# Patient Record
Sex: MALE | HISPANIC OR LATINO | Employment: UNEMPLOYED | URBAN - METROPOLITAN AREA
[De-identification: names, ages, dates, MRNs, and addresses within clinical notes are randomized per-mention and may not be internally consistent; named-entity substitution may affect disease eponyms.]

---

## 2017-02-02 ENCOUNTER — HOSPITAL ENCOUNTER (EMERGENCY)
Facility: MEDICAL CENTER | Age: 52
End: 2017-02-02
Attending: EMERGENCY MEDICINE

## 2017-02-02 VITALS
BODY MASS INDEX: 26.89 KG/M2 | SYSTOLIC BLOOD PRESSURE: 138 MMHG | OXYGEN SATURATION: 97 % | DIASTOLIC BLOOD PRESSURE: 78 MMHG | RESPIRATION RATE: 16 BRPM | HEART RATE: 80 BPM | WEIGHT: 187.39 LBS | TEMPERATURE: 98.7 F

## 2017-02-02 DIAGNOSIS — I25.10 CORONARY ARTERY DISEASE DUE TO LIPID RICH PLAQUE: ICD-10-CM

## 2017-02-02 DIAGNOSIS — I25.83 CORONARY ARTERY DISEASE DUE TO LIPID RICH PLAQUE: ICD-10-CM

## 2017-02-02 DIAGNOSIS — Z76.0 MEDICATION REFILL: ICD-10-CM

## 2017-02-02 PROCEDURE — 99282 EMERGENCY DEPT VISIT SF MDM: CPT

## 2017-02-02 RX ORDER — ATORVASTATIN CALCIUM 80 MG/1
80 TABLET, FILM COATED ORAL EVERY EVENING
Qty: 30 TAB | Refills: 0 | Status: SHIPPED | OUTPATIENT
Start: 2017-02-02 | End: 2017-03-07 | Stop reason: SDUPTHER

## 2017-02-02 RX ORDER — CARVEDILOL 6.25 MG/1
6.25 TABLET ORAL 2 TIMES DAILY WITH MEALS
Qty: 60 TAB | Refills: 0 | Status: SHIPPED | OUTPATIENT
Start: 2017-02-02 | End: 2017-03-07 | Stop reason: SDUPTHER

## 2017-02-02 RX ORDER — ATORVASTATIN CALCIUM 80 MG/1
80 TABLET, FILM COATED ORAL NIGHTLY
Status: SHIPPED | COMMUNITY
End: 2019-08-13

## 2017-02-02 RX ORDER — CLOPIDOGREL BISULFATE 75 MG/1
75 TABLET ORAL DAILY
Status: SHIPPED | COMMUNITY
End: 2019-08-13

## 2017-02-02 RX ORDER — CLOPIDOGREL BISULFATE 75 MG/1
75 TABLET ORAL DAILY
Qty: 30 TAB | Refills: 0 | Status: SHIPPED | OUTPATIENT
Start: 2017-02-02 | End: 2017-03-07 | Stop reason: SDUPTHER

## 2017-02-02 RX ORDER — LISINOPRIL 10 MG/1
10 TABLET ORAL DAILY
Qty: 30 TAB | Refills: 0 | Status: SHIPPED | OUTPATIENT
Start: 2017-02-02 | End: 2017-03-07 | Stop reason: SDUPTHER

## 2017-02-02 RX ORDER — LISINOPRIL 10 MG/1
10 TABLET ORAL DAILY
Status: SHIPPED | COMMUNITY
End: 2019-08-13

## 2017-02-02 RX ORDER — CARVEDILOL 6.25 MG/1
6.25 TABLET ORAL 2 TIMES DAILY WITH MEALS
Status: SHIPPED | COMMUNITY
End: 2019-08-13

## 2017-02-02 ASSESSMENT — PAIN SCALES - GENERAL: PAINLEVEL_OUTOF10: 0

## 2017-02-02 NOTE — ED AVS SNAPSHOT
After Visit Summary                                                                                                                Haily Gonzalez   MRN: [id number]    Department:  [facility], Emergency Dept   Date of Visit:  2/2/2017            [facility], Emergency Dept    [address] [facility] [address]    Phone:  [phone]      You were seen by     Alfred Walton M.D.      Your Diagnosis Was     Medication refill     Z76.0       Follow-up Information     1. Follow up with Harlan Moses M.D..    Specialty:  Internal Medicine    Contact information    [address]  [place] [address] [phone]          2. Follow up with [facility].    Contact information    [address] [address] [phone]        3. Schedule an appointment as soon as possible for a visit with Jason Walter M.D..    Specialty:  Family Medicine    Contact information    [address]  [address]  [place] [facility] [address] [phone]        Medication Information     Review all of your home medications and newly ordered medications with your primary doctor and/or pharmacist as soon as possible. Follow medication instructions as directed by your doctor and/or pharmacist.     Please keep your complete medication list with you and share with your physician. Update the information when medications are discontinued, doses are changed, or new medications (including over-the-counter products) are added; and carry medication information at all times in the event of emergency situations.               Medication List      CONTINUE taking these medications        Instructions    aspirin  MG Tbec   Commonly known as:  ECOTRIN    Take 1 Tab by mouth every day.   Dose:  325 mg       atorvastatin 80 MG tablet   Commonly known as:  LIPITOR    Take 1 Tab by mouth every evening.   Dose:  80 mg       carvedilol 6.25 MG Tabs   Commonly  known as:  COREG    Take 1 Tab by mouth 2 times a day, with meals.   Dose:  6.25 mg       clopidogrel 75 MG Tabs   Commonly known as:  PLAVIX    Take 1 Tab by mouth every day.   Dose:  75 mg       lisinopril 10 MG Tabs   Commonly known as:  PRINIVIL    Take 1 Tab by mouth every day.   Dose:  10 mg                 Discharge Instructions       Reposición de medicamentos en el servicio de emergencias  (Medicine Refill at the Emergency Department)  Hoy, hemos repuesto gamez medicamento, arya le recomendamos obtener las reposiciones en el consultorio de gamez médico de cabecera. En el futuro, planifique las reposiciones de modo que no necesite solicitarlas en el servicio de emergencias.  Si el medicamento repuesto fue de mantenimiento, es posible que haya recibido solo lo suficiente para que le alcance hasta que alec nuevamente a gamez médico de cabecera.     Esta información no tiene lucho fin reemplazar el consejo del médico. Asegúrese de hacerle al médico cualquier pregunta que tenga.     Document Released: 03/26/2009 Document Revised: 01/08/2016  Elsevier Interactive Patient Education ©2016 [employer]            Patient Information     Patient Information    Following emergency treatment: all patient requiring follow-up care must return either to a private physician or a clinic if your condition worsens before you are able to obtain further medical attention, please return to the emergency room.     Billing Information    At [facility], we work to make the billing process streamlined for our patients.  Our Representatives are here to answer any questions you may have regarding your hospital bill.  If you have insurance coverage and have supplied your insurance information to us, we will submit a claim to your insurer on your behalf.  Should you have any questions regarding your bill, we can be reached online or by phone as follows:  Online: You are able pay your bills online or live chat with our representatives about any  billing questions you may have. We are here to help Monday - Friday from 8:00am to 7:30pm and 9:00am - 12:00pm on Saturdays.  Please visit [email or web address]  for more information.   Phone:  [phone] or [phone]    Please note that your emergency physician, surgeon, pathologist, radiologist, anesthesiologist, and other specialists are not employed by [facility] and will therefore bill separately for their services.  Please contact them directly for any questions concerning their bills at the numbers below:     Emergency Physician Services:  [phone]  [facility]:  [phone]  [facility]:  [phone]  [facility]:  [phone]    1. Your final bill may vary from the amount quoted upon discharge if all procedures are not complete at that time, or if your doctor has additional procedures of which we are not aware. You will receive an additional bill if you return to the Emergency Department at [facility] for suture removal regardless of the facility of which the sutures were placed.     2. Please arrange for settlement of this account at the emergency registration.    3. All self-pay accounts are due in full at the time of treatment.  If you are unable to meet this obligation then payment is expected within 4-5 days.     4. If you have had radiology studies (CT, X-ray, Ultrasound, MRI), you have received a preliminary result during your emergency department visit. Please contact the radiology department [phone] to receive a copy of your final result. Please discuss the Final result with your primary physician or with the follow up physician provided.     Crisis Hotline:  [place] Crisis Hotline:  5-768-RYRRWTO or 1-281.810.6457  [place] Crisis Hotline:    1-191.135.5788 or 895-619-8162         ED Discharge Follow Up Questions    1. In order to provide you with very good care, we would like to follow up with a  phone call in the next few days.  May we have your permission to contact you?     YES /  NO    2. What is the best phone number to call you? (       )_____-__________    3. What is the best time to call you?      Morning  /  Afternoon  /  Evening                   Patient Signature:  ____________________________________________________________    Date:  ____________________________________________________________

## 2017-02-02 NOTE — ED AVS SNAPSHOT
2/2/2017          Haily Gonzalez  [address]  [place] [address]    Dear Haily:    [facility] wants to ensure your discharge home is safe and you or your loved ones have had all your questions answered regarding your care after you leave the hospital.    You may receive a telephone call within two days of your discharge.  This call is to make certain you understand your discharge instructions as well as ensure we provided you with the best care possible during your stay with us.     The call will only last approximately 3-5 minutes and will be done by a nurse.    Once again, we want to ensure your discharge home is safe and that you have a clear understanding of any next steps in your care.  If you have any questions or concerns, please do not hesitate to contact us, we are here for you.  Thank you for choosing [facility] for your healthcare needs.    Sincerely,    Alfonso Hui    [facility]

## 2017-02-02 NOTE — ED AVS SNAPSHOT
[employer] Access Code: [email or web address]  Expires: 2/24/2017 12:14 PM    Your email address is not on file at [facility].  Email Addresses are required for you to sign up for [employer], please contact [phone] to verify your personal information and to provide your email address prior to attempting to register for [employer].    Haily Gonzalez  [address]  [place] [address]    [employer]  A secure, online tool to manage your health information     [facility]’s [employer]® is a secure, online tool that connects you to your personalized health information from the privacy of your home -- day or night - making it very easy for you to manage your healthcare. Once the activation process is completed, you can even access your medical information using the [employer] manny, which is available for free in the Apple Manny store or Google Play store.     To learn more about [employer], visit [email or web address]    There are two levels of access available (as shown below):   My Chart Features  [facility] Primary Care Doctor [facility]  Specialists [facility]  Urgent  Care Non-[facility] Primary Care Doctor   Email your healthcare team securely and privately 24/7 X X X    Manage appointments: schedule your next appointment; view details of past/upcoming appointments X      Request prescription refills. X      View recent personal medical records, including lab and immunizations X X X X   View health record, including health history, allergies, medications X X X X   Read reports about your outpatient visits, procedures, consult and ER notes X X X X   See your discharge summary, which is a recap of your hospital and/or ER visit that includes your diagnosis, lab results, and care plan X X  X     How to register for [employer]:  Once your e-mail address has been verified, follow the following steps to sign up for [employer].     1. Go to  [email or web address]  2. Click on the Sign Up Now box, which takes you to the New Member Sign Up  page. You will need to provide the following information:  a. Enter your [employer] Access Code exactly as it appears at the top of this page. (You will not need to use this code after you’ve completed the sign-up process. If you do not sign up before the expiration date, you must request a new code.)   b. Enter your date of birth.   c. Enter your home email address.   d. Click Submit, and follow the next screen’s instructions.  3. Create a [employer] ID. This will be your [employer] login ID and cannot be changed, so think of one that is secure and easy to remember.  4. Create a [employer] password. You can change your password at any time.  5. Enter your Password Reset Question and Answer. This can be used at a later time if you forget your password.   6. Enter your e-mail address. This allows you to receive e-mail notifications when new information is available in [employer].  7. Click Sign Up. You can now view your health information.    For assistance activating your [employer] account, call [phone]

## 2017-02-03 NOTE — ED NOTES
"Chief Complaint   Patient presents with   • Medication Refill     pt states needs new prescriptions \"for all of my medications\"      /87 mmHg  Pulse 80  Temp(Src) 37.1 °C (98.7 °F)  Resp 16  Wt 85 kg (187 lb 6.3 oz)  SpO2 97%    [facility], was told needs new prescriptions.  Pt stated has not followed up w/ PCP.  Pt denies CP, denies N/V, denies feeling short of breath.   "

## 2017-02-03 NOTE — ED NOTES
Pt AAOx4 with no complaints of pain. No reports of difficulty breathing or SOB. No signs of distress or discomfort. Ambulatory with no assistance. [place] in lowest position, locked, and call light within reach.

## 2017-02-03 NOTE — DISCHARGE INSTRUCTIONS
Reposición de medicamentos en el servicio de emergencias  (Medicine Refill at the Emergency Department)  Hoy, hemos repuesto gamez medicamento, arya le recomendamos obtener las reposiciones en el consultorio de gamez médico de cabecera. En el futuro, planifique las reposiciones de modo que no necesite solicitarlas en el servicio de emergencias.  Si el medicamento repuesto fue de mantenimiento, es posible que haya recibido solo lo suficiente para que le alcance hasta que alec nuevamente a gamez médico de cabecera.     Esta información no tiene lucho fin reemplazar el consejo del médico. Asegúrese de hacerle al médico cualquier pregunta que tenga.     Document Released: 03/26/2009 Document Revised: 01/08/2016  Elsevier Interactive Patient Education ©2016 Elsevier Inc.

## 2017-02-03 NOTE — ED NOTES
Med rec complete per patient and [facility] [phone].  Allergies reviewed.

## 2017-02-03 NOTE — ED PROVIDER NOTES
"ED Provider Note    CHIEF COMPLAINT  Chief Complaint   Patient presents with   • Medication Refill     pt states needs new prescriptions \"for all of my medications\"        HPI  Haily Gonzalez is a 51 y.o. male who presents with request for medication refill, the patient states he takes a lot of medicines for his heart and today he went to get his medicine refilled at [facility] and they refuse. He states he can't access the family doctor, he has no chest pain or shortness of breath or any complaints whatsoever at this time.    REVIEW OF SYSTEMS  Negative for chest pain, dyspnea.    PAST MEDICAL HISTORY   has a past medical history of DDD (degenerative disc disease), lumbar (6/10/2009).    SOCIAL HISTORY  Social History     Social History Main Topics   • Smoking status: Former Smoker -- 10 years     Quit date: 01/31/2016   • Smokeless tobacco: Never Used   • Alcohol Use: No   • Drug Use: No   • Sexual Activity: Yes       SURGICAL HISTORY   has past surgical history that includes cardiac cath (1/31/16).    CURRENT MEDICATIONS  I personally reviewed the medication list in the charting documentation.     ALLERGIES  No Known Allergies    PHYSICAL EXAM  VITAL SIGNS: /87 mmHg  Pulse 80  Temp(Src) 37.1 °C (98.7 °F)  Resp 16  Wt 85 kg (187 lb 6.3 oz)  SpO2 97%  Constitutional: Alert in no apparent distress.  HENT: No signs of trauma.   Eyes: Conjunctiva normal, Non-icteric.   Chest: Normal nonlabored respirations  Skin: No erythema, No rash.   Musculoskeletal: Good range of motion in all major joints.   Neurologic: Alert, No focal deficits noted.   Psychiatric: Affect normal, Judgment normal.    COURSE & MEDICAL DECISION MAKING  Pertinent Labs & Imaging studies reviewed. (See chart for details)    Encounter Summary: This is a 51 y.o. male with request for medication refill of his usual medications which include Plavix, aspirin, lisinopril, Lipitor and carvedilol. He has no complaints whatsoever. I will " prescribe him a month worth of his medications however more poorly I will refer him to primary care.      DISPOSITION: Discharge Home      FINAL IMPRESSION  1. Medication refill    2. Coronary artery disease due to lipid rich plaque        This dictation was created using voice recognition software. The accuracy of the dictation is limited to the abilities of the software. I expect there may be some errors of grammar and possibly content. The nursing notes were reviewed and certain aspects of this information were incorporated into this note.    Electronically signed by: Alfred Walton, 2/2/2017 5:01 PM

## 2017-02-08 RX ORDER — CLOPIDOGREL BISULFATE 75 MG/1
75 TABLET ORAL DAILY
Qty: 30 TAB | Refills: 0 | OUTPATIENT
Start: 2017-02-08

## 2017-02-09 NOTE — TELEPHONE ENCOUNTER
He is one year out from his heart attack therefore he can discontinue the Plavix. However, he must take aspirin 81 mg daily. He does need to follow-up with somebody either our office or the [facility] clinic.

## 2017-03-07 DIAGNOSIS — I10 ESSENTIAL HYPERTENSION: ICD-10-CM

## 2017-03-07 DIAGNOSIS — I25.10 CORONARY ARTERY DISEASE DUE TO LIPID RICH PLAQUE: ICD-10-CM

## 2017-03-07 DIAGNOSIS — I25.83 CORONARY ARTERY DISEASE DUE TO LIPID RICH PLAQUE: ICD-10-CM

## 2017-03-07 RX ORDER — CLOPIDOGREL BISULFATE 75 MG/1
75 TABLET ORAL DAILY
Qty: 30 TAB | Refills: 0 | Status: CANCELLED | OUTPATIENT
Start: 2017-03-07

## 2017-03-07 RX ORDER — LISINOPRIL 10 MG/1
10 TABLET ORAL DAILY
Qty: 30 TAB | Refills: 0 | Status: CANCELLED | OUTPATIENT
Start: 2017-03-07

## 2017-03-08 ENCOUNTER — HOSPITAL ENCOUNTER (EMERGENCY)
Facility: MEDICAL CENTER | Age: 52
End: 2017-03-08
Attending: EMERGENCY MEDICINE

## 2017-03-08 VITALS
SYSTOLIC BLOOD PRESSURE: 122 MMHG | OXYGEN SATURATION: 97 % | WEIGHT: 187.17 LBS | DIASTOLIC BLOOD PRESSURE: 85 MMHG | TEMPERATURE: 98 F | RESPIRATION RATE: 16 BRPM | HEART RATE: 73 BPM | BODY MASS INDEX: 26.86 KG/M2

## 2017-03-08 DIAGNOSIS — I25.10 CORONARY ARTERY DISEASE INVOLVING NATIVE HEART WITHOUT ANGINA PECTORIS, UNSPECIFIED VESSEL OR LESION TYPE: ICD-10-CM

## 2017-03-08 DIAGNOSIS — Z76.0 MEDICATION REFILL: ICD-10-CM

## 2017-03-08 DIAGNOSIS — I25.10 CORONARY ARTERY DISEASE DUE TO LIPID RICH PLAQUE: ICD-10-CM

## 2017-03-08 DIAGNOSIS — I25.83 CORONARY ARTERY DISEASE DUE TO LIPID RICH PLAQUE: ICD-10-CM

## 2017-03-08 DIAGNOSIS — I10 ESSENTIAL HYPERTENSION: ICD-10-CM

## 2017-03-08 PROCEDURE — 99283 EMERGENCY DEPT VISIT LOW MDM: CPT

## 2017-03-08 RX ORDER — ATORVASTATIN CALCIUM 80 MG/1
80 TABLET, FILM COATED ORAL EVERY EVENING
Qty: 30 TAB | Refills: 0 | Status: SHIPPED | OUTPATIENT
Start: 2017-03-08 | End: 2017-04-07

## 2017-03-08 RX ORDER — CLOPIDOGREL BISULFATE 75 MG/1
75 TABLET ORAL DAILY
Qty: 30 TAB | Refills: 0 | Status: SHIPPED | OUTPATIENT
Start: 2017-03-08 | End: 2017-03-08

## 2017-03-08 RX ORDER — LISINOPRIL 10 MG/1
10 TABLET ORAL DAILY
Qty: 30 TAB | Refills: 0 | Status: SHIPPED | OUTPATIENT
Start: 2017-03-08 | End: 2017-03-08

## 2017-03-08 RX ORDER — LISINOPRIL 10 MG/1
10 TABLET ORAL DAILY
Qty: 30 TAB | Refills: 0 | Status: SHIPPED | OUTPATIENT
Start: 2017-03-08 | End: 2019-08-13

## 2017-03-08 RX ORDER — CLOPIDOGREL BISULFATE 75 MG/1
75 TABLET ORAL DAILY
Qty: 30 TAB | Refills: 0 | Status: SHIPPED | OUTPATIENT
Start: 2017-03-08 | End: 2019-08-13

## 2017-03-08 RX ORDER — CARVEDILOL 6.25 MG/1
6.25 TABLET ORAL 2 TIMES DAILY WITH MEALS
Qty: 60 TAB | Refills: 0 | Status: SHIPPED | OUTPATIENT
Start: 2017-03-08 | End: 2017-03-08

## 2017-03-08 RX ORDER — ATORVASTATIN CALCIUM 80 MG/1
80 TABLET, FILM COATED ORAL EVERY EVENING
Qty: 30 TAB | Refills: 0 | Status: SHIPPED | OUTPATIENT
Start: 2017-03-08 | End: 2017-03-08

## 2017-03-08 RX ORDER — CARVEDILOL 6.25 MG/1
6.25 TABLET ORAL 2 TIMES DAILY WITH MEALS
Qty: 60 TAB | Refills: 0 | Status: SHIPPED | OUTPATIENT
Start: 2017-03-08 | End: 2019-08-13

## 2017-03-08 NOTE — ED NOTES
"Chief Complaint   Patient presents with   • Medication Refill     \"All my heart medications\" seen last month for same     /85 mmHg  Pulse 73  Temp(Src) 36.7 °C (98 °F)  Resp 16  Wt 84.9 kg (187 lb 2.7 oz)  SpO2 97%    Pt has not followed up w/ PCP  "

## 2017-03-08 NOTE — ED AVS SNAPSHOT
[employer] Access Code: [email or web address]  Expires: 4/7/2017  4:20 PM    Your email address is not on file at [facility].  Email Addresses are required for you to sign up for [employer], please contact [phone] to verify your personal information and to provide your email address prior to attempting to register for [employer].    Haily Gonzalez  [address]  [place] [address]    [employer]  A secure, online tool to manage your health information     [facility]’s [employer]® is a secure, online tool that connects you to your personalized health information from the privacy of your home -- day or night - making it very easy for you to manage your healthcare. Once the activation process is completed, you can even access your medical information using the [employer] manny, which is available for free in the Apple Manny store or Google Play store.     To learn more about [employer], visit [email or web address]    There are two levels of access available (as shown below):   My Chart Features  [facility] Primary Care Doctor [facility]  Specialists [facility]  Urgent  Care Non-[facility] Primary Care Doctor   Email your healthcare team securely and privately 24/7 X X X    Manage appointments: schedule your next appointment; view details of past/upcoming appointments X      Request prescription refills. X      View recent personal medical records, including lab and immunizations X X X X   View health record, including health history, allergies, medications X X X X   Read reports about your outpatient visits, procedures, consult and ER notes X X X X   See your discharge summary, which is a recap of your hospital and/or ER visit that includes your diagnosis, lab results, and care plan X X  X     How to register for [employer]:  Once your e-mail address has been verified, follow the following steps to sign up for [employer].     1. Go to  [email or web address]  2. Click on the Sign Up Now box, which takes you to the New Member  Sign Up page. You will need to provide the following information:  a. Enter your [employer] Access Code exactly as it appears at the top of this page. (You will not need to use this code after you’ve completed the sign-up process. If you do not sign up before the expiration date, you must request a new code.)   b. Enter your date of birth.   c. Enter your home email address.   d. Click Submit, and follow the next screen’s instructions.  3. Create a [employer] ID. This will be your [employer] login ID and cannot be changed, so think of one that is secure and easy to remember.  4. Create a [employer] password. You can change your password at any time.  5. Enter your Password Reset Question and Answer. This can be used at a later time if you forget your password.   6. Enter your e-mail address. This allows you to receive e-mail notifications when new information is available in [employer].  7. Click Sign Up. You can now view your health information.    For assistance activating your [employer] account, call [phone]

## 2017-03-08 NOTE — ED AVS SNAPSHOT
3/8/2017          Haily Mattjeanette Gonzalez  [address]  [place] [address]    Dear Haily:    [facility] wants to ensure your discharge home is safe and you or your loved ones have had all your questions answered regarding your care after you leave the hospital.    You may receive a telephone call within two days of your discharge.  This call is to make certain you understand your discharge instructions as well as ensure we provided you with the best care possible during your stay with us.     The call will only last approximately 3-5 minutes and will be done by a nurse.    Once again, we want to ensure your discharge home is safe and that you have a clear understanding of any next steps in your care.  If you have any questions or concerns, please do not hesitate to contact us, we are here for you.  Thank you for choosing [facility] for your healthcare needs.    Sincerely,    Alfonso Hui    [facility]

## 2017-03-08 NOTE — ED AVS SNAPSHOT
Home Care Instructions                                                                                                                Haily Gonzalez   MRN: [id number]    Department:  [facility], Emergency Dept   Date of Visit:  3/8/2017            [facility], Emergency Dept    [address]    [place] [address]    Phone:  [phone]      You were seen by     Bogdan Tidwell M.D.      Your Diagnosis Was     Essential hypertension     I10       Follow-up Information     1. Follow up with [facility].    Contact information    [address]  [place] [id number] [phone]          2. Schedule an appointment as soon as possible for a visit with [facility].    Contact information    [address] [place] [address] [phone]        3. Schedule an appointment as soon as possible for a visit with Outpatient Anticoagulation Services.    Specialty:  Cardiology    Contact information    [address] [facility] [address] [phone]    Additional information:    From  [address], take the [facility] exit (# [address]) and head west on [address].  Turn right on [address].  Turn left on [address].  Turn left on [address].  Park in the [address] or [facility] parking garage, or you may use our  parking located at the [address] Entrance.      Medication Information     Review all of your home medications and newly ordered medications with your primary doctor and/or pharmacist as soon as possible. Follow medication instructions as directed by your doctor and/or pharmacist.     Please keep your complete medication list with you and share with your physician. Update the information when medications are discontinued, doses are changed, or new medications (including over-the-counter products) are added; and carry medication information at all times in the event of emergency situations.               Medication List      CONTINUE taking these medications        Instructions    Morning Afternoon Evening Bedtime    * aspirin  MG Tbec   Commonly known as:  ECOTRIN        Take 325 mg by mouth every day.   Dose:  325 mg                        * aspirin  MG Tbec   Commonly known as:  ECOTRIN        Take 1 Tab by mouth every day.   Dose:  325 mg                        * atorvastatin 80 MG tablet   Commonly known as:  LIPITOR        Take 80 mg by mouth every evening.   Dose:  80 mg                        * atorvastatin 80 MG tablet   Commonly known as:  LIPITOR        Take 1 Tab by mouth every evening for 30 days. Needs to be seen for further refills. Thank you   Dose:  80 mg                        * carvedilol 6.25 MG Tabs   Commonly known as:  COREG        Take 6.25 mg by mouth 2 times a day, with meals.   Dose:  6.25 mg                        * carvedilol 6.25 MG Tabs   Commonly known as:  COREG        Take 1 Tab by mouth 2 times a day, with meals. Needs to be seen for further refills. Thank you   Dose:  6.25 mg                        * clopidogrel 75 MG Tabs   Commonly known as:  PLAVIX        Take 75 mg by mouth every day.   Dose:  75 mg                        * clopidogrel 75 MG Tabs   Commonly known as:  PLAVIX        Take 1 Tab by mouth every day. Needs to be seen for further refills. Thank you   Dose:  75 mg                        * lisinopril 10 MG Tabs   Commonly known as:  PRINIVIL        Take 10 mg by mouth every day.   Dose:  10 mg                        * lisinopril 10 MG Tabs   Commonly known as:  PRINIVIL        Take 1 Tab by mouth every day. Needs to be seen for further refills. Thank you   Dose:  10 mg                        * Notice:  This list has 10 medication(s) that are the same as other medications prescribed for you. Read the directions carefully, and ask your doctor or other care provider to review them with you.         Where to Get Your Medications      You can get these medications from any  pharmacy     Bring a paper prescription for each of these medications    - atorvastatin 80 MG tablet  - carvedilol 6.25 MG Tabs  - clopidogrel 75 MG Tabs  - lisinopril 10 MG Tabs              Discharge Instructions       Reposición de medicamentos en el servicio de emergencias  (Medicine Refill at the Emergency Department)  Hoy, hemos repuesto gamez medicamento, arya le recomendamos obtener las reposiciones en el consultorio de gamez médico de cabecera. En el futuro, planifique las reposiciones de modo que no necesite solicitarlas en el servicio de emergencias.  Si el medicamento repuesto fue de mantenimiento, es posible que haya recibido solo lo suficiente para que le alcance hasta que alec nuevamente a gamez médico de cabecera.     Esta información no tiene lucho fin reemplazar el consejo del médico. Asegúrese de hacerle al médico cualquier pregunta que tenga.     Document Released: 03/26/2009 Document Revised: 01/08/2016  [employer] Interactive Patient Education ©2016 [employer]            Patient Information     Patient Information    Following emergency treatment: all patient requiring follow-up care must return either to a private physician or a clinic if your condition worsens before you are able to obtain further medical attention, please return to the emergency room.     Billing Information    At [facility], we work to make the billing process streamlined for our patients.  Our Representatives are here to answer any questions you may have regarding your hospital bill.  If you have insurance coverage and have supplied your insurance information to us, we will submit a claim to your insurer on your behalf.  Should you have any questions regarding your bill, we can be reached online or by phone as follows:  Online: You are able pay your bills online or live chat with our representatives about any billing questions you may have. We are here to help Monday - Friday from 8:00am to 7:30pm and 9:00am - 12:00pm on  Saturdays.  Please visit [email or web address]  for more information.   Phone:  [phone] or [phone]    Please note that your emergency physician, surgeon, pathologist, radiologist, anesthesiologist, and other specialists are not employed by [facility] and will therefore bill separately for their services.  Please contact them directly for any questions concerning their bills at the numbers below:     Emergency Physician Services:  [phone]  [facility]:  [phone]  [facility]:  [phone]  [facility]:  [phone]    1. Your final bill may vary from the amount quoted upon discharge if all procedures are not complete at that time, or if your doctor has additional procedures of which we are not aware. You will receive an additional bill if you return to the Emergency Department at [facility] for suture removal regardless of the facility of which the sutures were placed.     2. Please arrange for settlement of this account at the emergency registration.    3. All self-pay accounts are due in full at the time of treatment.  If you are unable to meet this obligation then payment is expected within 4-5 days.     4. If you have had radiology studies (CT, X-ray, Ultrasound, MRI), you have received a preliminary result during your emergency department visit. Please contact the radiology department [phone] to receive a copy of your final result. Please discuss the Final result with your primary physician or with the follow up physician provided.     Crisis Hotline:  [place] Crisis Hotline:  6-930-GPRVZVU or 1-366.301.5031  [place] Crisis Hotline:    1-578.735.7460 or 003-852-0360         ED Discharge Follow Up Questions    1. In order to provide you with very good care, we would like to follow up with a phone call in the next few days.  May we have your permission to contact you?     YES /  NO    2. What is the best  phone number to call you? (       )_____-__________    3. What is the best time to call you?      Morning  /  Afternoon  /  Evening                   Patient Signature:  ____________________________________________________________    Date:  ____________________________________________________________

## 2017-03-09 NOTE — DISCHARGE INSTRUCTIONS
Reposición de medicamentos en el servicio de emergencias  (Medicine Refill at the Emergency Department)  Hoy, hemos repuesto gamez medicamento, arya le recomendamos obtener las reposiciones en el consultorio de gamez médico de cabecera. En el futuro, planifique las reposiciones de modo que no necesite solicitarlas en el servicio de emergencias.  Si el medicamento repuesto fue de mantenimiento, es posible que haya recibido solo lo suficiente para que le alcance hasta que alec nuevamente a gamez médico de cabecera.     Esta información no tiene lucoh fin reemplazar el consejo del médico. Asegúrese de hacerle al médico cualquier pregunta que tenga.     Document Released: 03/26/2009 Document Revised: 01/08/2016  [employer] Interactive Patient Education ©2016 [employer] Inc[employer]

## 2017-03-09 NOTE — ED PROVIDER NOTES
"ED Provider Note    CHIEF COMPLAINT  Chief Complaint   Patient presents with   • Medication Refill     \"All my heart medications\" seen last month for same        HPI  Haily Gonzalez is a 51 y.o. male who presents to the ED just for refill of \"all of his heart medications.\" The patient was seen here last month for the same thing and was told that he needed to get a primary care physician. Currently has no symptoms. Denies any chest pain, fever, chills, nausea, vomiting, severe because he ran out of his medications.    REVIEW OF SYSTEMS  See HPI for further details. All other systems are negative.     PAST MEDICAL HISTORY  Past Medical History   Diagnosis Date   • DDD (degenerative disc disease), lumbar 6/10/2009       FAMILY HISTORY  Family History   Problem Relation Age of Onset   • Diabetes Mother    • Hypertension Father    • Heart Attack Father 70     CAd     Patient's family history has been discussed and is been found to be noncontributory to his present illness  SOCIAL HISTORY  Social History     Social History   • Marital Status: Single     Spouse Name: N/A   • Number of Children: N/A   • Years of Education: N/A     Social History Main Topics   • Smoking status: Former Smoker -- 10 years     Quit date: 01/31/2016   • Smokeless tobacco: Never Used   • Alcohol Use: No   • Drug Use: No   • Sexual Activity: Yes     Other Topics Concern   • None     Social History Narrative      Harlan Moses M.D.      SURGICAL HISTORY  Past Surgical History   Procedure Laterality Date   • Cardiac cath  1/31/16     unable to open RCA with Thombus       CURRENT MEDICATIONS  Home Medications     **Home medications have not yet been reviewed for this encounter**        No current facility-administered medications on file prior to encounter.     Current Outpatient Prescriptions on File Prior to Encounter   Medication Sig Dispense Refill   • aspirin EC (ECOTRIN) 325 MG Tablet Delayed Response Take 1 Tab by mouth every day. 60 " Tab 0   • atorvastatin (LIPITOR) 80 MG tablet Take 80 mg by mouth every evening.     • carvedilol (COREG) 6.25 MG Tab Take 6.25 mg by mouth 2 times a day, with meals.     • clopidogrel (PLAVIX) 75 MG Tab Take 75 mg by mouth every day.     • lisinopril (PRINIVIL) 10 MG Tab Take 10 mg by mouth every day.     • aspirin EC (ECOTRIN) 325 MG Tablet Delayed Response Take 325 mg by mouth every day.         ALLERGIES  No Known Allergies    PHYSICAL EXAM  VITAL SIGNS: /85 mmHg  Pulse 73  Temp(Src) 36.7 °C (98 °F)  Resp 16  Wt 84.9 kg (187 lb 2.7 oz)  SpO2 97%  Pulse Oximetry was obtained. It showed a reading of Pulse Oximetry: 97 %.  I interpreted this as not hypoxic.   Constitutional :  Well developed, Well nourished, No acute distress, Non-toxic appearance.   HENT: Head is normal without signs of trauma. Posterior pharynx is normal  Eyes: Conjunctiva as normal  Neck: Normal range of motion, No tenderness, Supple, No stridor.   Lymphatic: No anterior lymphadenopathy. No carotid bruit.   Cardiovascular: Normal heart rate, Normal rhythm, No murmurs, No rubs, No gallops.   Thorax & Lungs: Clear to auscultation bilaterally  Skin: Warm, Dry, No erythema, No rash.   Extremities: Intact distal pulses, No edema, No tenderness, No cyanosis, No clubbing.       RADIOLOGY/PROCEDURES  None    COURSE & MEDICAL DECISION MAKING  Pertinent Labs & Imaging studies reviewed. (See chart for details)  Patient presents for evaluation. Clinically the patient appears well. Blood pressure is otherwise well. I explained to the patient in a more than happy to give him a month's worth of his medications but he needs to follow-up with a primary care physician. Patient states his primary care physician O longer takes his insurance have recommended that he follow-up with [facility]'s clinic. I have explained to the patient that there is more to preventative care than just getting her medications. It's about ensuring that the cholesterols are  normal with the medications are doing their underlying job. At this point, I do not feel that laboratory studies are emergently necessary, but I do feel that the patient needs to follow-up with her primary care physician and not have his medicines refilled in the emergency department.    FINAL IMPRESSION  1. Essential hypertension    2. Coronary artery disease involving native heart without angina pectoris, unspecified vessel or lesion type    3. Medication refill    4. Coronary artery disease due to lipid rich plaque           The patient will return for new or worsening symptoms and is stable at the time of discharge.    The patient is referred to a primary physician for blood pressure management, diabetic screening, and for all other preventative health concerns.    DISPOSITION:  Patient will be discharged home in stable condition.    FOLLOW UP:  [facility]  [address]  [facility] [address]  [phone]          [address] [address]  [phone]  Schedule an appointment as soon as possible for a visit      Outpatient Anticoagulation Services  [address] [facility] [id number] [phone]  Schedule an appointment as soon as possible for a visit        OUTPATIENT MEDICATIONS:  Discharge Medication List as of 3/8/2017  4:23 PM                Electronically signed by: Bogdna Tidwell, 3/8/2017

## 2019-08-13 ENCOUNTER — HOSPITAL ENCOUNTER (EMERGENCY)
Facility: MEDICAL CENTER | Age: 54
End: 2019-08-13
Attending: EMERGENCY MEDICINE

## 2019-08-13 ENCOUNTER — APPOINTMENT (OUTPATIENT)
Dept: RADIOLOGY | Facility: MEDICAL CENTER | Age: 54
End: 2019-08-13

## 2019-08-13 VITALS
HEIGHT: 67 IN | WEIGHT: 182.76 LBS | BODY MASS INDEX: 28.69 KG/M2 | TEMPERATURE: 98.1 F | DIASTOLIC BLOOD PRESSURE: 80 MMHG | RESPIRATION RATE: 16 BRPM | OXYGEN SATURATION: 98 % | SYSTOLIC BLOOD PRESSURE: 112 MMHG | HEART RATE: 55 BPM

## 2019-08-13 DIAGNOSIS — R07.89 ATYPICAL CHEST PAIN: ICD-10-CM

## 2019-08-13 LAB
ALBUMIN SERPL BCP-MCNC: 4.5 G/DL (ref 3.2–4.9)
ALBUMIN/GLOB SERPL: 1.4 G/DL
ALP SERPL-CCNC: 69 U/L (ref 30–99)
ALT SERPL-CCNC: 20 U/L (ref 2–50)
ANION GAP SERPL CALC-SCNC: 11 MMOL/L (ref 0–11.9)
AST SERPL-CCNC: 22 U/L (ref 12–45)
BASOPHILS # BLD AUTO: 0.7 % (ref 0–1.8)
BASOPHILS # BLD: 0.03 K/UL (ref 0–0.12)
BILIRUB SERPL-MCNC: 1.2 MG/DL (ref 0.1–1.5)
BUN SERPL-MCNC: 20 MG/DL (ref 8–22)
CALCIUM SERPL-MCNC: 9.6 MG/DL (ref 8.5–10.5)
CHLORIDE SERPL-SCNC: 102 MMOL/L (ref 96–112)
CO2 SERPL-SCNC: 24 MMOL/L (ref 20–33)
CREAT SERPL-MCNC: 1.19 MG/DL (ref 0.5–1.4)
EKG IMPRESSION: NORMAL
EKG IMPRESSION: NORMAL
EOSINOPHIL # BLD AUTO: 0.06 K/UL (ref 0–0.51)
EOSINOPHIL NFR BLD: 1.4 % (ref 0–6.9)
ERYTHROCYTE [DISTWIDTH] IN BLOOD BY AUTOMATED COUNT: 43.9 FL (ref 35.9–50)
GLOBULIN SER CALC-MCNC: 3.3 G/DL (ref 1.9–3.5)
GLUCOSE SERPL-MCNC: 103 MG/DL (ref 65–99)
HCT VFR BLD AUTO: 49 % (ref 42–52)
HGB BLD-MCNC: 16.2 G/DL (ref 14–18)
IMM GRANULOCYTES # BLD AUTO: 0.01 K/UL (ref 0–0.11)
IMM GRANULOCYTES NFR BLD AUTO: 0.2 % (ref 0–0.9)
LYMPHOCYTES # BLD AUTO: 1.38 K/UL (ref 1–4.8)
LYMPHOCYTES NFR BLD: 32.2 % (ref 22–41)
MCH RBC QN AUTO: 29.1 PG (ref 27–33)
MCHC RBC AUTO-ENTMCNC: 33.1 G/DL (ref 33.7–35.3)
MCV RBC AUTO: 88.1 FL (ref 81.4–97.8)
MONOCYTES # BLD AUTO: 0.51 K/UL (ref 0–0.85)
MONOCYTES NFR BLD AUTO: 11.9 % (ref 0–13.4)
NEUTROPHILS # BLD AUTO: 2.29 K/UL (ref 1.82–7.42)
NEUTROPHILS NFR BLD: 53.6 % (ref 44–72)
NRBC # BLD AUTO: 0 K/UL
NRBC BLD-RTO: 0 /100 WBC
PLATELET # BLD AUTO: 203 K/UL (ref 164–446)
PMV BLD AUTO: 8.6 FL (ref 9–12.9)
POTASSIUM SERPL-SCNC: 3.9 MMOL/L (ref 3.6–5.5)
PROT SERPL-MCNC: 7.8 G/DL (ref 6–8.2)
RBC # BLD AUTO: 5.56 M/UL (ref 4.7–6.1)
SODIUM SERPL-SCNC: 137 MMOL/L (ref 135–145)
TROPONIN T SERPL-MCNC: <6 NG/L (ref 6–19)
TROPONIN T SERPL-MCNC: <6 NG/L (ref 6–19)
WBC # BLD AUTO: 4.3 K/UL (ref 4.8–10.8)

## 2019-08-13 PROCEDURE — 80053 COMPREHEN METABOLIC PANEL: CPT

## 2019-08-13 PROCEDURE — 36415 COLL VENOUS BLD VENIPUNCTURE: CPT

## 2019-08-13 PROCEDURE — 93005 ELECTROCARDIOGRAM TRACING: CPT

## 2019-08-13 PROCEDURE — 99284 EMERGENCY DEPT VISIT MOD MDM: CPT

## 2019-08-13 PROCEDURE — 84484 ASSAY OF TROPONIN QUANT: CPT

## 2019-08-13 PROCEDURE — 93005 ELECTROCARDIOGRAM TRACING: CPT | Performed by: EMERGENCY MEDICINE

## 2019-08-13 PROCEDURE — 71045 X-RAY EXAM CHEST 1 VIEW: CPT

## 2019-08-13 PROCEDURE — 85025 COMPLETE CBC W/AUTO DIFF WBC: CPT

## 2019-08-13 NOTE — ED PROVIDER NOTES
ED Provider Note    Scribed for Mk Trujillo M.D. by Mk Trujillo. 8/13/2019,  4:16 PM.    CHIEF COMPLAINT  Chief Complaint   Patient presents with   • Chest Pain     left chest since last night    • Abdominal Pain     left middle        HPI  Haily Gonzalez is a 54 y.o. male who presents to the Emergency Department for intermittent pain in the left side of his chest, which she says actually happens from time to time, not just since last night.  He says that he has not had fevers or chills or nausea or vomiting.  He notices this pain more when he lifts something heavy, but not with walking or elevated heart rate per se.  He has not had diaphoresis or vomiting.  There is been no radiation of the pain.  He is concerned because of a history of ST elevation MI, and coronary artery disease with a previous failed angioplasty and thrombectomy.  He is followed by the [facility] clinic.  The abdominal pain he feels is a separate issue.  He thinks it is probably related to some constipation, with having to strain to stool.  The pain is in the left side of the abdomen, lateral to the umbilicus.  He does not feel it is related in time or location to the chest discomfort.      REVIEW OF SYSTEMS  See HPI for further details. All other systems are negative.     PAST MEDICAL HISTORY   has a past medical history of CAD (coronary artery disease) and DDD (degenerative disc disease), lumbar (6/10/2009).    SOCIAL HISTORY  Social History     Tobacco Use   • Smoking status: Former Smoker     Years: 10.00     Last attempt to quit: 1/31/2016     Years since quitting: 3.5   • Smokeless tobacco: Never Used   Substance and Sexual Activity   • Alcohol use: No   • Drug use: No   • Sexual activity: Yes     Social History     Substance and Sexual Activity   Drug Use No       SURGICAL HISTORY   has a past surgical history that includes zzz cardiac cath (1/31/16).    CURRENT MEDICATIONS  Home Medications      "Reviewed by Antonia Ko R.N. (Registered Nurse) on 08/13/19 at 1318  Med List Status: Partial   Medication Last Dose Status   aspirin EC (ECOTRIN) 325 MG Tablet Delayed Response 8/13/2019 Active   atorvastatin (LIPITOR) 80 MG tablet 8/13/2019 Active   carvedilol (COREG) 6.25 MG Tab 8/13/2019 Active   clopidogrel (PLAVIX) 75 MG Tab 8/13/2019 Active   lisinopril (PRINIVIL) 10 MG Tab 8/13/2019 Active                ALLERGIES  No Known Allergies    PHYSICAL EXAM  VITAL SIGNS: /91   Pulse 60   Temp 36.7 °C (98.1 °F) (Oral)   Resp 16   Ht 1.702 m (5' 7\")   Wt 82.9 kg (182 lb 12.2 oz)   SpO2 98%   BMI 28.62 kg/m²   Pulse ox interpretation: I interpret this pulse ox as normal.  Constitutional: Alert in no apparent distress.  HENT: No signs of trauma, Bilateral external ears normal, Nose normal.   Eyes: Conjunctiva normal, Non-icteric.   Neck: Normal range of motion, Supple, No stridor.   Lymphatic: No lymphadenopathy noted.   Cardiovascular: Regular rate and rhythm, no murmurs.   Thorax & Lungs: Normal breath sounds, No respiratory distress, No wheezing, No chest tenderness.   Abdomen: Bowel sounds normal, Soft, No tenderness, No masses, No pulsatile masses. No peritoneal signs.  Skin: Warm, Dry, No erythema, No rash.   Back: No midline bony tenderness.   Extremities: Intact distal pulses, No edema, No cyanosis.  Musculoskeletal: Good range of motion in all major joints. No or major deformities noted.   Neurologic: Alert , Normal motor function, Normal sensory function, No focal deficits noted.   Psychiatric: Affect normal, Judgment normal, Mood normal.     DIAGNOSTIC STUDIES / PROCEDURES    EKG  Interpreted by me    Rhythm:  Normal sinus rhythm   EARLY PRECORDIAL R/S TRANSITION  INFERIOR INFARCT, AGE INDETERMINATE  Compared to ECG 12/28/2016 19:36:02  No significant changes    EKG#2:   Interpreted by me    SINUS BRADYCARDIA  BORDERLINE AV CONDUCTION DELAY  EARLY PRECORDIAL R/S TRANSITION  INFERIOR " INFARCT, AGE INDETERMINATE  Compared to ECG 08/13/2019 13:14:18  Sinus rhythm no longer present  Myocardial infarct finding still present      LABS  Labs Reviewed   CBC WITH DIFFERENTIAL - Abnormal; Notable for the following components:       Result Value    WBC 4.3 (*)     MCHC 33.1 (*)     MPV 8.6 (*)     All other components within normal limits   COMP METABOLIC PANEL - Abnormal; Notable for the following components:    Glucose 103 (*)     All other components within normal limits   TROPONIN   ESTIMATED GFR   TROPONIN     All labs reviewed by me.    RADIOLOGY  DX-CHEST-PORTABLE (1 VIEW)   Final Result      No acute cardiopulmonary disease.        The radiologist's interpretation of all radiological studies have been reviewed by me.    COURSE & MEDICAL DECISION MAKING  Nursing notes, VS, PMSFHx reviewed in chart.     4:16 PM Patient seen and examined at bedside.  He has a significant history, but his symptoms are quite atypical, his presenting EKG is unchanged, and his first troponin, ordered from triage, was undetectable.  We will repeat a second troponin at this time.  Differential diagnosis includes but is not limited to ACS, angina, pneumonia, musculoskeletal chest pain. Ordered for serial troponins and serial EKGs, chest x-ray, cardiac monitoring to evaluate.    4:53 PM this patient's her EKG EKG is unchanged from previous, which was unchanged from 2016.  His repeat troponin is pending.    5:45 PM this patient serial EKGs were unchanged, and serial troponins were undetectable.  There is no sign of acute coronary syndrome or any dangerous cause of the patient's symptoms.  He was strongly urged to follow-up with his primary care clinic, or return here if necessary for worsening symptoms.     The patient will return for new or worsening symptoms and is stable at the time of discharge.    The patient is referred to a primary physician for blood pressure management, diabetic screening, and for all other  preventative health concerns.    DISPOSITION:  Patient will be discharged home in stable condition.    FOLLOW UP:  [address] [address] [phone]  Schedule an appointment as soon as possible for a visit         OUTPATIENT MEDICATIONS:  New Prescriptions    No medications on file         FINAL IMPRESSION  1. Atypical chest pain

## 2019-08-13 NOTE — ED NOTES
Pt to [facility] with family.  Pt here for above complaint.  Connected to cardiac monitor, BP cuff, and continuous pulse ox upon arrival.  Pt in gown, call light in reach, bed in lowest position.  Chart up for ERP.

## 2019-08-13 NOTE — ED NOTES
Chief Complaint   Patient presents with   • Chest Pain     left chest since last night    • Abdominal Pain     left middle      Pt ambulated to triage with above complaints started yesterday.   Protocol ordered.

## 2019-08-14 NOTE — DISCHARGE INSTRUCTIONS
Karlene pruebas aquí fueron tranquilizadoras. No hay signos de ningún daño en gamez corazón. Programe fabio visita de seguimiento con gamez clínica de atención primaria o use la información de contacto de la clínica que le proporcionamos aquí. Regrese al departamento de emergencias por empeoramiento o síntomas graves que gamez clínica de atención primaria no puede manejar.    (Your tests here were all reassuring.  There is no sign of any damage to your heart.  Please schedule a follow-up visit with your primary care clinic or use the contact information for the clinic we have provided here. Return to the emergency department for worsening or severe symptoms that cannot be managed by your primary care clinic.)

## 2019-08-14 NOTE — ED NOTES
Med rec updated and complete. Allergies reviewed.  Pt is not currently taking any medications . All medications   Removed from med rec.  [facility].

## 2020-04-03 ENCOUNTER — APPOINTMENT (OUTPATIENT)
Dept: RADIOLOGY | Facility: MEDICAL CENTER | Age: 55
End: 2020-04-03
Attending: EMERGENCY MEDICINE

## 2020-04-03 ENCOUNTER — HOSPITAL ENCOUNTER (EMERGENCY)
Facility: MEDICAL CENTER | Age: 55
End: 2020-04-03
Attending: EMERGENCY MEDICINE

## 2020-04-03 VITALS
BODY MASS INDEX: 28.24 KG/M2 | DIASTOLIC BLOOD PRESSURE: 95 MMHG | HEIGHT: 67 IN | OXYGEN SATURATION: 97 % | HEART RATE: 64 BPM | SYSTOLIC BLOOD PRESSURE: 134 MMHG | TEMPERATURE: 99.1 F | WEIGHT: 179.9 LBS | RESPIRATION RATE: 12 BRPM

## 2020-04-03 DIAGNOSIS — F41.9 ANXIOUSNESS: ICD-10-CM

## 2020-04-03 DIAGNOSIS — R07.89 CHEST WALL PAIN: ICD-10-CM

## 2020-04-03 DIAGNOSIS — R07.9 CHEST PAIN, UNSPECIFIED TYPE: ICD-10-CM

## 2020-04-03 LAB
ALBUMIN SERPL BCP-MCNC: 4.7 G/DL (ref 3.2–4.9)
ALBUMIN/GLOB SERPL: 1.7 G/DL
ALP SERPL-CCNC: 63 U/L (ref 30–99)
ALT SERPL-CCNC: 20 U/L (ref 2–50)
ANION GAP SERPL CALC-SCNC: 13 MMOL/L (ref 7–16)
AST SERPL-CCNC: 22 U/L (ref 12–45)
BASOPHILS # BLD AUTO: 0.7 % (ref 0–1.8)
BASOPHILS # BLD: 0.03 K/UL (ref 0–0.12)
BILIRUB SERPL-MCNC: 1 MG/DL (ref 0.1–1.5)
BUN SERPL-MCNC: 12 MG/DL (ref 8–22)
CALCIUM SERPL-MCNC: 9.2 MG/DL (ref 8.4–10.2)
CHLORIDE SERPL-SCNC: 97 MMOL/L (ref 96–112)
CO2 SERPL-SCNC: 26 MMOL/L (ref 20–33)
CREAT SERPL-MCNC: 0.86 MG/DL (ref 0.5–1.4)
EKG IMPRESSION: NORMAL
EOSINOPHIL # BLD AUTO: 0.06 K/UL (ref 0–0.51)
EOSINOPHIL NFR BLD: 1.4 % (ref 0–6.9)
ERYTHROCYTE [DISTWIDTH] IN BLOOD BY AUTOMATED COUNT: 42.8 FL (ref 35.9–50)
GLOBULIN SER CALC-MCNC: 2.7 G/DL (ref 1.9–3.5)
GLUCOSE SERPL-MCNC: 101 MG/DL (ref 65–99)
HCT VFR BLD AUTO: 48.1 % (ref 42–52)
HGB BLD-MCNC: 16.2 G/DL (ref 14–18)
IMM GRANULOCYTES # BLD AUTO: 0 K/UL (ref 0–0.11)
IMM GRANULOCYTES NFR BLD AUTO: 0 % (ref 0–0.9)
LIPASE SERPL-CCNC: 36 U/L (ref 7–58)
LYMPHOCYTES # BLD AUTO: 1.8 K/UL (ref 1–4.8)
LYMPHOCYTES NFR BLD: 43.5 % (ref 22–41)
MCH RBC QN AUTO: 29.8 PG (ref 27–33)
MCHC RBC AUTO-ENTMCNC: 33.7 G/DL (ref 33.7–35.3)
MCV RBC AUTO: 88.6 FL (ref 81.4–97.8)
MONOCYTES # BLD AUTO: 0.37 K/UL (ref 0–0.85)
MONOCYTES NFR BLD AUTO: 8.9 % (ref 0–13.4)
NEUTROPHILS # BLD AUTO: 1.88 K/UL (ref 1.82–7.42)
NEUTROPHILS NFR BLD: 45.5 % (ref 44–72)
NRBC # BLD AUTO: 0 K/UL
NRBC BLD-RTO: 0 /100 WBC
NT-PROBNP SERPL IA-MCNC: 63 PG/ML (ref 0–125)
PLATELET # BLD AUTO: 174 K/UL (ref 164–446)
PMV BLD AUTO: 8.6 FL (ref 9–12.9)
POTASSIUM SERPL-SCNC: 3.9 MMOL/L (ref 3.6–5.5)
PROT SERPL-MCNC: 7.4 G/DL (ref 6–8.2)
RBC # BLD AUTO: 5.43 M/UL (ref 4.7–6.1)
SODIUM SERPL-SCNC: 136 MMOL/L (ref 135–145)
TROPONIN T SERPL-MCNC: <6 NG/L (ref 6–19)
WBC # BLD AUTO: 4.1 K/UL (ref 4.8–10.8)

## 2020-04-03 PROCEDURE — 99284 EMERGENCY DEPT VISIT MOD MDM: CPT

## 2020-04-03 PROCEDURE — 80053 COMPREHEN METABOLIC PANEL: CPT

## 2020-04-03 PROCEDURE — 83880 ASSAY OF NATRIURETIC PEPTIDE: CPT

## 2020-04-03 PROCEDURE — 93005 ELECTROCARDIOGRAM TRACING: CPT | Performed by: EMERGENCY MEDICINE

## 2020-04-03 PROCEDURE — 93005 ELECTROCARDIOGRAM TRACING: CPT

## 2020-04-03 PROCEDURE — 85025 COMPLETE CBC W/AUTO DIFF WBC: CPT

## 2020-04-03 PROCEDURE — 84484 ASSAY OF TROPONIN QUANT: CPT

## 2020-04-03 PROCEDURE — 71045 X-RAY EXAM CHEST 1 VIEW: CPT

## 2020-04-03 PROCEDURE — 83690 ASSAY OF LIPASE: CPT

## 2020-04-03 RX ORDER — IBUPROFEN 600 MG/1
600 TABLET ORAL EVERY 6 HOURS PRN
Qty: 30 TAB | Refills: 0 | Status: ON HOLD | OUTPATIENT
Start: 2020-04-03 | End: 2020-09-29

## 2020-04-03 ASSESSMENT — FIBROSIS 4 INDEX: FIB4 SCORE: 1.33

## 2020-04-04 NOTE — ED PROVIDER NOTES
ED Provider Note    CHIEF COMPLAINT  Chief Complaint   Patient presents with   • Chest Pain     HPI  Mr. Matt is a 54-year-old male with a past medical history of prior myocardial infarction and coronary artery disease with failed previous angioplasty who presents emergency room for evaluation of chest pain.  This was sudden onset yesterday evening while he was asleep.  It awoke him with a sensation of shortness of breath that resolved after 10 to 20 seconds.  There was no direct chest pressure, there is no radiation of the pain beyond the left anterior chest wall.  He does occasionally notice some pain when lifting objects but denies any exertional symptoms or shortness of breath.  He denies any diaphoresis or vomiting.  He is concerned about his previous heart history and came in for further evaluation.  Denies abdominal pain, headaches, dizziness, palpitations or leg swelling    Respiratory Precautions  During the information gathering assessment, Pt noted that he has not travelled outside the [place], has not had contact with sick individuals, or mentions any other concerning risk factors at this time.  Based on the concern for risk of exposures, and the possibility of delayed exposure notification, this practitioner used extensive PPI during the history gathering, exam and discussions with the patient.  Pt was wearing a mask during these encounters    REVIEW OF SYSTEMS  See HPI for further details. All other systems are negative.     PAST MEDICAL HISTORY   has a past medical history of CAD (coronary artery disease) and DDD (degenerative disc disease), lumbar (6/10/2009).    SOCIAL HISTORY  Social History     Tobacco Use   • Smoking status: Former Smoker     Years: 10.00     Last attempt to quit: 2016     Years since quittin.1   • Smokeless tobacco: Never Used   Substance and Sexual Activity   • Alcohol use: No   • Drug use: No   • Sexual activity: Yes       SURGICAL HISTORY   has a past surgical history  "that includes [facility] cardiac cath (1/31/16).    CURRENT MEDICATIONS  Home Medications    **Home medications have not yet been reviewed for this encounter**       ALLERGIES  No Known Allergies    PHYSICAL EXAM  VITAL SIGNS: /95   Pulse 68   Temp 37.3 °C (99.1 °F) (Temporal)   Resp 18   Ht 1.702 m (5' 7\")   Wt 81.6 kg (179 lb 14.3 oz)   SpO2 97%   BMI 28.18 kg/m²   Pulse ox interpretation: I interpret this pulse ox as normal.  Genl: M sitting in gurney comfortably, speaking clearly, appears in no acute distress   Head: NC/AT   ENT: Mucous membranes moist, posterior pharynx clear, uvula midline, nares patent bilaterally   Eyes: Normal sclera, pupils equal round reactive to light  Neck: Supple, FROM, no LAD appreciated  Pulmonary: Lungs are clear to auscultation bilaterally  Chest: No TTP  CV:  RRR, no murmur appreciated, pulses 2+ in both upper and lower extremities  Abdomen: soft, NT/ND; no rebound/guarding, no masses palpated, no HSM   : no CVA or suprapubic tenderness  Musculoskeletal: Pain free ROM of the neck. Moving upper and lower extremities and spontaneous in coordinated fashion  Neuro: A&Ox4 (person, place, time, situation), speech fluent, gait steady, no focal deficits appreciated, No cerebellar signs..  Sensation is grossly intact in the distal upper and lower extremities.  5/5 strength in  and dorsiflexion/plantar flexion of the ankles  Psych: Patient has an appropriate affect and behavior  Skin: No rash or lesions.  No pallor or jaundice.  No cyanosis.  Warm and dry.     DIAGNOSTIC STUDIES / PROCEDURES    EKG  EKG#1 at 1723 shows: rate of 61, rhythm sinus, axis normal/slightly rightward, intervals slightly longer pr at 204ms, QRS narrow, ST/T waves without acute ischemia or infarction.  unchanges from prior on 8/13/19    LABS  Labs Reviewed   CBC WITH DIFFERENTIAL - Abnormal; Notable for the following components:       Result Value    WBC 4.1 (*)     MPV 8.6 (*)     Lymphocytes 43.50 (*)  "    All other components within normal limits   COMP METABOLIC PANEL - Abnormal; Notable for the following components:    Glucose 101 (*)     All other components within normal limits   PROBRAIN NATRIURETIC PEPTIDE, NT   TROPONIN   LIPASE   ESTIMATED GFR     RADIOLOGY  DX-CHEST-PORTABLE (1 VIEW)   Final Result         1. No acute cardiopulmonary abnormalities are identified.        COURSE & MEDICAL DECISION MAKING  Pertinent Labs & Imaging studies reviewed. (See chart for details)    DDX:  ACS unlikely  Pneumothorax  Pulmonary embolism unlikely  Aortic dissection  Pneumonia  Myocarditis  Endocarditis  Pancreatitis  GERD  Anxiety  Viral syndrome    MDM  Initial evaluation at 1730:  Patient presents with a chief complaint of chest pain with my initial primary concern includes the differential listed above.  His pain symptomology is more peripheral in nature, reproducible with palpation during certain circumstances and is fleeting in character.  This is not truly consistent with a ACS type mechanism and he demonstrates no evidence of pleurisy or hypoxia concerning for an acute PE.  Triage vital signs are reviewed and are reassuring.  PERC score is not used due to age but otherwise low risk with stable vital signs and no new right heart strain on EKG. I obtained intravenous access. I reviewed the patient's history reported in the chart. A chest xray obtained and is unremarkable for pneumonia, pneumothorax, or widened mediastinum on my read. Laboratory analysis is obtained to evaluate for myocardial ischemia, evidence of infection, electrolyte abnormality, and abdominal sources for a cause for the patient's chest pain which are reassuring. Troponin is not elevated, and after >6 hours of onset with no new exacerbations I do not think repeats are helpful for his current pathology. EKG are obtained to evaluate for ischemia and arrythmia and are unchanged from prior EKGs in late last year  ?  As there are no significant  laboratory findings, a nonischemic EKG, and troponin, it is felt that the patient is not experiencing acute myocardial infarction at this time. Based on the patient's history, physical exam, and laboratory analysis, the patient's most likely diagnosis is chest wall pain, muscular strain, post viral syndrome. A discussion was had with the patient in which it was explained that the risk for heart attack and death is very low, approaching < 1% according to the best available evidence.   He would like to follow up with his primary care provider, is otherwise established and understands the strict return precautions should he have sweating, arm pain, chest pressure with exertion or other evolving symptoms  ?  HEART SCORE: 3 (age,hx)    FINAL IMPRESSION  Visit Diagnoses     ICD-10-CM   1. Chest pain, unspecified type R07.9   2. Chest wall pain R07.89   3. Anxiousness F41.9       Electronically signed by: Damien Galdamez M.D., 4/3/2020 5:28 PM

## 2020-04-04 NOTE — ED TRIAGE NOTES
Pt amb to triage c/o cp since last noc. Pt c/o L arm and chest wall/ 'muscle' pain. Pt primarily Croatian-speaking. ekg compl in triage

## 2020-09-28 ENCOUNTER — APPOINTMENT (OUTPATIENT)
Dept: RADIOLOGY | Facility: MEDICAL CENTER | Age: 55
End: 2020-09-28
Attending: EMERGENCY MEDICINE

## 2020-09-28 ENCOUNTER — HOSPITAL ENCOUNTER (OUTPATIENT)
Facility: MEDICAL CENTER | Age: 55
End: 2020-09-29
Attending: EMERGENCY MEDICINE | Admitting: HOSPITALIST

## 2020-09-28 PROBLEM — R73.09 ELEVATED GLUCOSE: Status: ACTIVE | Noted: 2020-09-28

## 2020-09-28 PROBLEM — I10 HTN (HYPERTENSION): Status: ACTIVE | Noted: 2020-09-28

## 2020-09-28 LAB
ALBUMIN SERPL BCP-MCNC: 4.6 G/DL (ref 3.2–4.9)
ALBUMIN/GLOB SERPL: 1.4 G/DL
ALP SERPL-CCNC: 76 U/L (ref 30–99)
ALT SERPL-CCNC: 21 U/L (ref 2–50)
ANION GAP SERPL CALC-SCNC: 9 MMOL/L (ref 7–16)
AST SERPL-CCNC: 17 U/L (ref 12–45)
BASOPHILS # BLD AUTO: 0.4 % (ref 0–1.8)
BASOPHILS # BLD: 0.02 K/UL (ref 0–0.12)
BILIRUB SERPL-MCNC: 1 MG/DL (ref 0.1–1.5)
BUN SERPL-MCNC: 12 MG/DL (ref 8–22)
CALCIUM SERPL-MCNC: 9.3 MG/DL (ref 8.4–10.2)
CHLORIDE SERPL-SCNC: 98 MMOL/L (ref 96–112)
CO2 SERPL-SCNC: 32 MMOL/L (ref 20–33)
COVID ORDER STATUS COVID19: NORMAL
CREAT SERPL-MCNC: 0.96 MG/DL (ref 0.5–1.4)
EKG IMPRESSION: NORMAL
EOSINOPHIL # BLD AUTO: 0.05 K/UL (ref 0–0.51)
EOSINOPHIL NFR BLD: 1 % (ref 0–6.9)
ERYTHROCYTE [DISTWIDTH] IN BLOOD BY AUTOMATED COUNT: 43.3 FL (ref 35.9–50)
GLOBULIN SER CALC-MCNC: 3.2 G/DL (ref 1.9–3.5)
GLUCOSE SERPL-MCNC: 123 MG/DL (ref 65–99)
HCT VFR BLD AUTO: 50.5 % (ref 42–52)
HGB BLD-MCNC: 17.2 G/DL (ref 14–18)
IMM GRANULOCYTES # BLD AUTO: 0 K/UL (ref 0–0.11)
IMM GRANULOCYTES NFR BLD AUTO: 0 % (ref 0–0.9)
LYMPHOCYTES # BLD AUTO: 1.7 K/UL (ref 1–4.8)
LYMPHOCYTES NFR BLD: 34.6 % (ref 22–41)
MCH RBC QN AUTO: 29.8 PG (ref 27–33)
MCHC RBC AUTO-ENTMCNC: 34.1 G/DL (ref 33.7–35.3)
MCV RBC AUTO: 87.5 FL (ref 81.4–97.8)
MONOCYTES # BLD AUTO: 0.36 K/UL (ref 0–0.85)
MONOCYTES NFR BLD AUTO: 7.3 % (ref 0–13.4)
NEUTROPHILS # BLD AUTO: 2.78 K/UL (ref 1.82–7.42)
NEUTROPHILS NFR BLD: 56.7 % (ref 44–72)
NRBC # BLD AUTO: 0 K/UL
NRBC BLD-RTO: 0 /100 WBC
PLATELET # BLD AUTO: 182 K/UL (ref 164–446)
PMV BLD AUTO: 8.4 FL (ref 9–12.9)
POTASSIUM SERPL-SCNC: 4.2 MMOL/L (ref 3.6–5.5)
PROT SERPL-MCNC: 7.8 G/DL (ref 6–8.2)
RBC # BLD AUTO: 5.77 M/UL (ref 4.7–6.1)
SODIUM SERPL-SCNC: 139 MMOL/L (ref 135–145)
TROPONIN T SERPL-MCNC: 6 NG/L (ref 6–19)
TROPONIN T SERPL-MCNC: 8 NG/L (ref 6–19)
WBC # BLD AUTO: 4.9 K/UL (ref 4.8–10.8)

## 2020-09-28 PROCEDURE — C9803 HOPD COVID-19 SPEC COLLECT: HCPCS | Performed by: HOSPITALIST

## 2020-09-28 PROCEDURE — 99220 PR INITIAL OBSERVATION CARE,LEVL III: CPT | Performed by: HOSPITALIST

## 2020-09-28 PROCEDURE — 99285 EMERGENCY DEPT VISIT HI MDM: CPT

## 2020-09-28 PROCEDURE — G0378 HOSPITAL OBSERVATION PER HR: HCPCS

## 2020-09-28 PROCEDURE — 84484 ASSAY OF TROPONIN QUANT: CPT

## 2020-09-28 PROCEDURE — 83036 HEMOGLOBIN GLYCOSYLATED A1C: CPT

## 2020-09-28 PROCEDURE — 80053 COMPREHEN METABOLIC PANEL: CPT

## 2020-09-28 PROCEDURE — 71045 X-RAY EXAM CHEST 1 VIEW: CPT

## 2020-09-28 PROCEDURE — 93005 ELECTROCARDIOGRAM TRACING: CPT | Performed by: EMERGENCY MEDICINE

## 2020-09-28 PROCEDURE — U0003 INFECTIOUS AGENT DETECTION BY NUCLEIC ACID (DNA OR RNA); SEVERE ACUTE RESPIRATORY SYNDROME CORONAVIRUS 2 (SARS-COV-2) (CORONAVIRUS DISEASE [COVID-19]), AMPLIFIED PROBE TECHNIQUE, MAKING USE OF HIGH THROUGHPUT TECHNOLOGIES AS DESCRIBED BY CMS-2020-01-R: HCPCS

## 2020-09-28 PROCEDURE — 85025 COMPLETE CBC W/AUTO DIFF WBC: CPT

## 2020-09-28 RX ORDER — BISACODYL 10 MG
10 SUPPOSITORY, RECTAL RECTAL
Status: DISCONTINUED | OUTPATIENT
Start: 2020-09-28 | End: 2020-09-29 | Stop reason: HOSPADM

## 2020-09-28 RX ORDER — AMOXICILLIN 250 MG
2 CAPSULE ORAL 2 TIMES DAILY
Status: DISCONTINUED | OUTPATIENT
Start: 2020-09-29 | End: 2020-09-29 | Stop reason: HOSPADM

## 2020-09-28 RX ORDER — LISINOPRIL 20 MG/1
20 TABLET ORAL
Status: DISCONTINUED | OUTPATIENT
Start: 2020-09-29 | End: 2020-09-29 | Stop reason: HOSPADM

## 2020-09-28 RX ORDER — CARVEDILOL 6.25 MG/1
3.12 TABLET ORAL 2 TIMES DAILY WITH MEALS
Status: DISCONTINUED | OUTPATIENT
Start: 2020-09-29 | End: 2020-09-29 | Stop reason: HOSPADM

## 2020-09-28 RX ORDER — PROMETHAZINE HYDROCHLORIDE 25 MG/1
12.5-25 TABLET ORAL EVERY 4 HOURS PRN
Status: DISCONTINUED | OUTPATIENT
Start: 2020-09-28 | End: 2020-09-29 | Stop reason: HOSPADM

## 2020-09-28 RX ORDER — MORPHINE SULFATE 4 MG/ML
2 INJECTION, SOLUTION INTRAMUSCULAR; INTRAVENOUS
Status: DISCONTINUED | OUTPATIENT
Start: 2020-09-28 | End: 2020-09-29 | Stop reason: HOSPADM

## 2020-09-28 RX ORDER — PROMETHAZINE HYDROCHLORIDE 25 MG/1
12.5-25 SUPPOSITORY RECTAL EVERY 4 HOURS PRN
Status: DISCONTINUED | OUTPATIENT
Start: 2020-09-28 | End: 2020-09-29 | Stop reason: HOSPADM

## 2020-09-28 RX ORDER — ASPIRIN 325 MG
325 TABLET ORAL DAILY
Status: DISCONTINUED | OUTPATIENT
Start: 2020-09-29 | End: 2020-09-29

## 2020-09-28 RX ORDER — POLYETHYLENE GLYCOL 3350 17 G/17G
1 POWDER, FOR SOLUTION ORAL
Status: DISCONTINUED | OUTPATIENT
Start: 2020-09-28 | End: 2020-09-29 | Stop reason: HOSPADM

## 2020-09-28 RX ORDER — PROCHLORPERAZINE EDISYLATE 5 MG/ML
5-10 INJECTION INTRAMUSCULAR; INTRAVENOUS EVERY 4 HOURS PRN
Status: DISCONTINUED | OUTPATIENT
Start: 2020-09-28 | End: 2020-09-29 | Stop reason: HOSPADM

## 2020-09-28 RX ORDER — CLOPIDOGREL BISULFATE 75 MG/1
75 TABLET ORAL DAILY
Status: DISCONTINUED | OUTPATIENT
Start: 2020-09-29 | End: 2020-09-29 | Stop reason: HOSPADM

## 2020-09-28 RX ORDER — NITROGLYCERIN 0.4 MG/1
0.4 TABLET SUBLINGUAL
Status: DISCONTINUED | OUTPATIENT
Start: 2020-09-28 | End: 2020-09-29

## 2020-09-28 RX ORDER — ATORVASTATIN CALCIUM 10 MG/1
20 TABLET, FILM COATED ORAL EVERY EVENING
Status: DISCONTINUED | OUTPATIENT
Start: 2020-09-28 | End: 2020-09-29 | Stop reason: HOSPADM

## 2020-09-28 RX ORDER — ASPIRIN 81 MG/1
324 TABLET, CHEWABLE ORAL DAILY
Status: DISCONTINUED | OUTPATIENT
Start: 2020-09-29 | End: 2020-09-29

## 2020-09-28 RX ORDER — ACETAMINOPHEN 325 MG/1
650 TABLET ORAL EVERY 6 HOURS PRN
Status: DISCONTINUED | OUTPATIENT
Start: 2020-09-28 | End: 2020-09-29 | Stop reason: HOSPADM

## 2020-09-28 RX ORDER — ONDANSETRON 2 MG/ML
4 INJECTION INTRAMUSCULAR; INTRAVENOUS EVERY 4 HOURS PRN
Status: DISCONTINUED | OUTPATIENT
Start: 2020-09-28 | End: 2020-09-29 | Stop reason: HOSPADM

## 2020-09-28 RX ORDER — ASPIRIN 600 MG/1
300 SUPPOSITORY RECTAL DAILY
Status: DISCONTINUED | OUTPATIENT
Start: 2020-09-29 | End: 2020-09-29

## 2020-09-28 RX ORDER — ONDANSETRON 4 MG/1
4 TABLET, ORALLY DISINTEGRATING ORAL EVERY 4 HOURS PRN
Status: DISCONTINUED | OUTPATIENT
Start: 2020-09-28 | End: 2020-09-29 | Stop reason: HOSPADM

## 2020-09-28 ASSESSMENT — ENCOUNTER SYMPTOMS
SHORTNESS OF BREATH: 0
NAUSEA: 0
HEADACHES: 0
FEVER: 0
NECK PAIN: 0
WEAKNESS: 0
SORE THROAT: 0
MYALGIAS: 0
BRUISES/BLEEDS EASILY: 0
DEPRESSION: 0
DOUBLE VISION: 0
INSOMNIA: 0
PALPITATIONS: 0
DIZZINESS: 0
BLURRED VISION: 0
VOMITING: 0
COUGH: 0

## 2020-09-28 ASSESSMENT — PAIN DESCRIPTION - DESCRIPTORS: DESCRIPTORS: ACHING;PRESSURE

## 2020-09-28 ASSESSMENT — FIBROSIS 4 INDEX: FIB4 SCORE: 1.58

## 2020-09-29 ENCOUNTER — PATIENT OUTREACH (OUTPATIENT)
Dept: HEALTH INFORMATION MANAGEMENT | Facility: OTHER | Age: 55
End: 2020-09-29

## 2020-09-29 ENCOUNTER — APPOINTMENT (OUTPATIENT)
Dept: RADIOLOGY | Facility: MEDICAL CENTER | Age: 55
End: 2020-09-29
Attending: HOSPITALIST

## 2020-09-29 VITALS
OXYGEN SATURATION: 95 % | DIASTOLIC BLOOD PRESSURE: 74 MMHG | TEMPERATURE: 98.6 F | WEIGHT: 179.9 LBS | HEIGHT: 68 IN | SYSTOLIC BLOOD PRESSURE: 118 MMHG | RESPIRATION RATE: 18 BRPM | HEART RATE: 74 BPM | BODY MASS INDEX: 27.26 KG/M2

## 2020-09-29 LAB
ANION GAP SERPL CALC-SCNC: 15 MMOL/L (ref 7–16)
BASOPHILS # BLD AUTO: 0.4 % (ref 0–1.8)
BASOPHILS # BLD: 0.02 K/UL (ref 0–0.12)
BUN SERPL-MCNC: 10 MG/DL (ref 8–22)
CALCIUM SERPL-MCNC: 8.8 MG/DL (ref 8.4–10.2)
CHLORIDE SERPL-SCNC: 100 MMOL/L (ref 96–112)
CO2 SERPL-SCNC: 24 MMOL/L (ref 20–33)
CREAT SERPL-MCNC: 0.84 MG/DL (ref 0.5–1.4)
EKG IMPRESSION: NORMAL
EOSINOPHIL # BLD AUTO: 0.08 K/UL (ref 0–0.51)
EOSINOPHIL NFR BLD: 1.6 % (ref 0–6.9)
ERYTHROCYTE [DISTWIDTH] IN BLOOD BY AUTOMATED COUNT: 43.7 FL (ref 35.9–50)
EST. AVERAGE GLUCOSE BLD GHB EST-MCNC: 105 MG/DL
GLUCOSE SERPL-MCNC: 87 MG/DL (ref 65–99)
HBA1C MFR BLD: 5.3 % (ref 0–5.6)
HCT VFR BLD AUTO: 45.9 % (ref 42–52)
HGB BLD-MCNC: 15.5 G/DL (ref 14–18)
IMM GRANULOCYTES # BLD AUTO: 0.01 K/UL (ref 0–0.11)
IMM GRANULOCYTES NFR BLD AUTO: 0.2 % (ref 0–0.9)
LYMPHOCYTES # BLD AUTO: 2.13 K/UL (ref 1–4.8)
LYMPHOCYTES NFR BLD: 42.9 % (ref 22–41)
MCH RBC QN AUTO: 29.7 PG (ref 27–33)
MCHC RBC AUTO-ENTMCNC: 33.8 G/DL (ref 33.7–35.3)
MCV RBC AUTO: 87.9 FL (ref 81.4–97.8)
MONOCYTES # BLD AUTO: 0.35 K/UL (ref 0–0.85)
MONOCYTES NFR BLD AUTO: 7.1 % (ref 0–13.4)
NEUTROPHILS # BLD AUTO: 2.37 K/UL (ref 1.82–7.42)
NEUTROPHILS NFR BLD: 47.8 % (ref 44–72)
NRBC # BLD AUTO: 0 K/UL
NRBC BLD-RTO: 0 /100 WBC
PLATELET # BLD AUTO: 152 K/UL (ref 164–446)
PMV BLD AUTO: 8.7 FL (ref 9–12.9)
POTASSIUM SERPL-SCNC: 4 MMOL/L (ref 3.6–5.5)
RBC # BLD AUTO: 5.22 M/UL (ref 4.7–6.1)
SARS-COV-2 RNA RESP QL NAA+PROBE: NOTDETECTED
SODIUM SERPL-SCNC: 139 MMOL/L (ref 135–145)
SPECIMEN SOURCE: NORMAL
TROPONIN T SERPL-MCNC: <6 NG/L (ref 6–19)
WBC # BLD AUTO: 5 K/UL (ref 4.8–10.8)

## 2020-09-29 PROCEDURE — G0378 HOSPITAL OBSERVATION PER HR: HCPCS

## 2020-09-29 PROCEDURE — 85025 COMPLETE CBC W/AUTO DIFF WBC: CPT

## 2020-09-29 PROCEDURE — 93018 CV STRESS TEST I&R ONLY: CPT | Performed by: INTERNAL MEDICINE

## 2020-09-29 PROCEDURE — 78452 HT MUSCLE IMAGE SPECT MULT: CPT | Mod: 26 | Performed by: INTERNAL MEDICINE

## 2020-09-29 PROCEDURE — A9502 TC99M TETROFOSMIN: HCPCS

## 2020-09-29 PROCEDURE — 93010 ELECTROCARDIOGRAM REPORT: CPT | Performed by: INTERNAL MEDICINE

## 2020-09-29 PROCEDURE — 99245 OFF/OP CONSLTJ NEW/EST HI 55: CPT | Mod: 25 | Performed by: INTERNAL MEDICINE

## 2020-09-29 PROCEDURE — 84484 ASSAY OF TROPONIN QUANT: CPT

## 2020-09-29 PROCEDURE — 99217 PR OBSERVATION CARE DISCHARGE: CPT | Performed by: HOSPITALIST

## 2020-09-29 PROCEDURE — 80048 BASIC METABOLIC PNL TOTAL CA: CPT

## 2020-09-29 PROCEDURE — A9270 NON-COVERED ITEM OR SERVICE: HCPCS | Performed by: HOSPITALIST

## 2020-09-29 PROCEDURE — 93005 ELECTROCARDIOGRAM TRACING: CPT | Performed by: HOSPITALIST

## 2020-09-29 PROCEDURE — 700102 HCHG RX REV CODE 250 W/ 637 OVERRIDE(OP): Performed by: HOSPITALIST

## 2020-09-29 RX ORDER — LISINOPRIL 10 MG/1
10 TABLET ORAL DAILY
Status: ON HOLD | COMMUNITY
End: 2021-09-26 | Stop reason: SDUPTHER

## 2020-09-29 RX ORDER — CARVEDILOL 6.25 MG/1
6.25 TABLET ORAL 2 TIMES DAILY WITH MEALS
Status: ON HOLD | COMMUNITY
End: 2021-09-26 | Stop reason: SDUPTHER

## 2020-09-29 RX ORDER — CLOPIDOGREL BISULFATE 75 MG/1
75 TABLET ORAL DAILY
Status: ON HOLD | COMMUNITY
End: 2021-09-26 | Stop reason: SDUPTHER

## 2020-09-29 RX ORDER — NITROGLYCERIN 400 UG/1
1 SPRAY ORAL PRN
Qty: 4.9 G | Refills: 1 | Status: ON HOLD | OUTPATIENT
Start: 2020-09-29 | End: 2021-09-26 | Stop reason: SDUPTHER

## 2020-09-29 RX ORDER — ATORVASTATIN CALCIUM 80 MG/1
80 TABLET, FILM COATED ORAL EVERY EVENING
Status: ON HOLD | COMMUNITY
End: 2021-09-26 | Stop reason: SDUPTHER

## 2020-09-29 RX ORDER — NITROGLYCERIN 0.4 MG/1
0.4 TABLET SUBLINGUAL
Status: CANCELLED | OUTPATIENT
Start: 2020-09-29

## 2020-09-29 RX ADMIN — ASPIRIN 81 MG CHEWABLE TABLET 324 MG: 81 TABLET CHEWABLE at 05:33

## 2020-09-29 RX ADMIN — ATORVASTATIN CALCIUM 20 MG: 10 TABLET, FILM COATED ORAL at 00:44

## 2020-09-29 RX ADMIN — ATORVASTATIN CALCIUM 20 MG: 10 TABLET, FILM COATED ORAL at 17:30

## 2020-09-29 RX ADMIN — CLOPIDOGREL BISULFATE 75 MG: 75 TABLET ORAL at 05:33

## 2020-09-29 RX ADMIN — CARVEDILOL 3.12 MG: 6.25 TABLET, FILM COATED ORAL at 17:31

## 2020-09-29 RX ADMIN — LISINOPRIL 20 MG: 20 TABLET ORAL at 05:33

## 2020-09-29 ASSESSMENT — LIFESTYLE VARIABLES
EVER FELT BAD OR GUILTY ABOUT YOUR DRINKING: NO
HAVE YOU EVER FELT YOU SHOULD CUT DOWN ON YOUR DRINKING: NO
CONSUMPTION TOTAL: NEGATIVE
TOTAL SCORE: 0
TOTAL SCORE: 0
ON A TYPICAL DAY WHEN YOU DRINK ALCOHOL HOW MANY DRINKS DO YOU HAVE: 1
TOTAL SCORE: 0
HAVE PEOPLE ANNOYED YOU BY CRITICIZING YOUR DRINKING: NO
HOW MANY TIMES IN THE PAST YEAR HAVE YOU HAD 5 OR MORE DRINKS IN A DAY: 0
ALCOHOL_USE: YES
AVERAGE NUMBER OF DAYS PER WEEK YOU HAVE A DRINK CONTAINING ALCOHOL: 1
EVER HAD A DRINK FIRST THING IN THE MORNING TO STEADY YOUR NERVES TO GET RID OF A HANGOVER: NO

## 2020-09-29 ASSESSMENT — COGNITIVE AND FUNCTIONAL STATUS - GENERAL
MOBILITY SCORE: 24
SUGGESTED CMS G CODE MODIFIER MOBILITY: CH
DAILY ACTIVITIY SCORE: 24
SUGGESTED CMS G CODE MODIFIER DAILY ACTIVITY: CH

## 2020-09-29 ASSESSMENT — PAIN DESCRIPTION - PAIN TYPE: TYPE: ACUTE PAIN

## 2020-09-29 ASSESSMENT — PATIENT HEALTH QUESTIONNAIRE - PHQ9
SUM OF ALL RESPONSES TO PHQ9 QUESTIONS 1 AND 2: 0
1. LITTLE INTEREST OR PLEASURE IN DOING THINGS: NOT AT ALL
2. FEELING DOWN, DEPRESSED, IRRITABLE, OR HOPELESS: NOT AT ALL

## 2020-09-29 ASSESSMENT — FIBROSIS 4 INDEX: FIB4 SCORE: 1.14

## 2020-09-29 NOTE — PROGRESS NOTES
· 2 RN skin check complete with LEONOR Laboy.  · Skin assessment: WNL  · Devices in place: PIV, tele monitor  · Skin assessed under devices: intact  · Confirmed pressure ulcers found on: n/a  · New potential pressure ulcers noted on n/a.   · The following interventions in place: pt is ambulatory, repositions self, and has pillows for support

## 2020-09-29 NOTE — ED PROVIDER NOTES
ED Provider Note    CHIEF COMPLAINT  Chief Complaint   Patient presents with   • Chest Pain   • Shortness of Breath       HPI  Haily Gonzalez is a 55 y.o. male who presents for evaluation of chest discomfort mild shortness of breath.  The patient has a known history of coronary artery disease.  He underwent cardiac catheterization 4 years ago and had RCA thrombosis which was partially fixed.  He had to go back to the Cath Lab apparently about a month later.  He was given a stent.  He apparently has been compliant with his Plavix as well his his lisinopril.  Pain is described as heaviness left-sided pressure.  No associated pain radiating to the shoulder blades no associated uterus chills or productive cough    REVIEW OF SYSTEMS  See HPI for further details.  No high fevers chills night sweats weight loss all other systems are negative.     PAST MEDICAL HISTORY  Past Medical History:   Diagnosis Date   • DDD (degenerative disc disease), lumbar 6/10/2009   • CAD (coronary artery disease)        FAMILY HISTORY  Noncontributory    SOCIAL HISTORY  Social History     Socioeconomic History   • Marital status: Single     Spouse name: Not on file   • Number of children: Not on file   • Years of education: Not on file   • Highest education level: Not on file   Occupational History   • Not on file   Social Needs   • Financial resource strain: Not on file   • Food insecurity     Worry: Not on file     Inability: Not on file   • Transportation needs     Medical: Not on file     Non-medical: Not on file   Tobacco Use   • Smoking status: Former Smoker     Years: 10.00     Quit date: 2016     Years since quittin.6   • Smokeless tobacco: Never Used   Substance and Sexual Activity   • Alcohol use: No   • Drug use: No   • Sexual activity: Yes   Lifestyle   • Physical activity     Days per week: Not on file     Minutes per session: Not on file   • Stress: Not on file   Relationships   • Social connections     Talks on  "phone: Not on file     Gets together: Not on file     Attends Restorationist service: Not on file     Active member of club or organization: Not on file     Attends meetings of clubs or organizations: Not on file     Relationship status: Not on file   • Intimate partner violence     Fear of current or ex partner: Not on file     Emotionally abused: Not on file     Physically abused: Not on file     Forced sexual activity: Not on file   Other Topics Concern   • Not on file   Social History Narrative   • Not on file   Denies IV drugs    SURGICAL HISTORY  Past Surgical History:   Procedure Laterality Date   • ZZZ CARDIAC CATH  1/31/16    unable to open RCA with Thombus       CURRENT MEDICATIONS  Home Medications     Reviewed by Levar Martin R.N. (Registered Nurse) on 09/28/20 at 1823  Med List Status: Partial   Medication Last Dose Status   ibuprofen (MOTRIN) 600 MG Tab  Active                ALLERGIES  No Known Allergies    PHYSICAL EXAM  VITAL SIGNS: /86   Pulse 75   Temp 36.1 °C (96.9 °F) (Temporal)   Resp 18   Ht 1.727 m (5' 8\")   Wt 81.6 kg (179 lb 14.3 oz)   SpO2 99%   BMI 27.35 kg/m²       Constitutional: Well developed, Well nourished, No acute distress, Non-toxic appearance.   HENT: Normocephalic, Atraumatic, Bilateral external ears normal, Oropharynx moist, No oral exudates, Nose normal.   Eyes: PERRLA, EOMI, Conjunctiva normal, No discharge.   Neck: Normal range of motion, No tenderness, Supple, No stridor.   Cardiovascular: Normal heart rate, Normal rhythm, No murmurs, No rubs, No gallops.   Thorax & Lungs: Normal breath sounds, No respiratory distress, No wheezing, No chest tenderness.   Abdomen: Bowel sounds normal, Soft, No tenderness, No masses, No pulsatile masses.   Skin: Warm, Dry, No erythema, No rash.   Extremities: Intact distal pulses, No edema, No tenderness, No cyanosis, No clubbing.   Neurologic: Alert & oriented x 3, Normal motor function, Normal sensory function, No focal deficits " noted.   Psychiatric: Affect normal, Judgment normal, Mood normal.     DX-CHEST-PORTABLE (1 VIEW)   Final Result         1.  No acute cardiopulmonary disease.          Results for orders placed or performed during the hospital encounter of 09/28/20   CBC with Differential   Result Value Ref Range    WBC 4.9 4.8 - 10.8 K/uL    RBC 5.77 4.70 - 6.10 M/uL    Hemoglobin 17.2 14.0 - 18.0 g/dL    Hematocrit 50.5 42.0 - 52.0 %    MCV 87.5 81.4 - 97.8 fL    MCH 29.8 27.0 - 33.0 pg    MCHC 34.1 33.7 - 35.3 g/dL    RDW 43.3 35.9 - 50.0 fL    Platelet Count 182 164 - 446 K/uL    MPV 8.4 (L) 9.0 - 12.9 fL    Neutrophils-Polys 56.70 44.00 - 72.00 %    Lymphocytes 34.60 22.00 - 41.00 %    Monocytes 7.30 0.00 - 13.40 %    Eosinophils 1.00 0.00 - 6.90 %    Basophils 0.40 0.00 - 1.80 %    Immature Granulocytes 0.00 0.00 - 0.90 %    Nucleated RBC 0.00 /100 WBC    Neutrophils (Absolute) 2.78 1.82 - 7.42 K/uL    Lymphs (Absolute) 1.70 1.00 - 4.80 K/uL    Monos (Absolute) 0.36 0.00 - 0.85 K/uL    Eos (Absolute) 0.05 0.00 - 0.51 K/uL    Baso (Absolute) 0.02 0.00 - 0.12 K/uL    Immature Granulocytes (abs) 0.00 0.00 - 0.11 K/uL    NRBC (Absolute) 0.00 K/uL   Complete Metabolic Panel (CMP)   Result Value Ref Range    Sodium 139 135 - 145 mmol/L    Potassium 4.2 3.6 - 5.5 mmol/L    Chloride 98 96 - 112 mmol/L    Co2 32 20 - 33 mmol/L    Anion Gap 9.0 7.0 - 16.0    Glucose 123 (H) 65 - 99 mg/dL    Bun 12 8 - 22 mg/dL    Creatinine 0.96 0.50 - 1.40 mg/dL    Calcium 9.3 8.4 - 10.2 mg/dL    AST(SGOT) 17 12 - 45 U/L    ALT(SGPT) 21 2 - 50 U/L    Alkaline Phosphatase 76 30 - 99 U/L    Total Bilirubin 1.0 0.1 - 1.5 mg/dL    Albumin 4.6 3.2 - 4.9 g/dL    Total Protein 7.8 6.0 - 8.2 g/dL    Globulin 3.2 1.9 - 3.5 g/dL    A-G Ratio 1.4 g/dL   Troponin   Result Value Ref Range    Troponin T 8 6 - 19 ng/L   ESTIMATED GFR   Result Value Ref Range    GFR If African American >60 >60 mL/min/1.73 m 2    GFR If Non African American >60 >60 mL/min/1.73 m 2    TROPONIN   Result Value Ref Range    Troponin T 6 6 - 19 ng/L   EKG   Result Value Ref Range    Report       [facility] Emergency Dept.    Test Date:  2020  Pt Name:    EILEEN EASTON      Department: E[facility]M  MRN:        [id number]                      [facility]  Gender:     Male                         Technician: ALEXIS  :        1965                   Requested By:ER TRIAGE PROTOCOL  Order #:    [id number]                    Reading MD:    Measurements  Intervals                                Axis  Rate:       70                           P:          66  AR:         199                          QRS:        -23  QRSD:       97                           T:          -19  QT:         379  QTc:        409    Interpretive Statements  Sinus rhythm  Abnormal R-wave progression, early transition  Inferior infarct, age indeterminate  Compared to ECG 2020 17:23:58  No significant changes        EKG interpretation by me rate 70 sinus rhythm poor R wave progression old anterior infarct no acute new injury T-segment elevation or depression no pathological T wave inversions  COURSE & MEDICAL DECISION MAKING  Pertinent Labs & Imaging studies reviewed. (See chart for details)  Patient presents here with chest discomfort.  His HEART score is quite high given high suspicion, age of 55, known coronary artery disease.  EKG does not clearly demonstrate any ischemia and initial troponin is negative.  He apparently has been compliant with his Plavix but has not consistently seen a physician, primary care doctor or cardiologist.  The patient will be admitted to telemetry care for further evaluation and likely stress testing    FINAL IMPRESSION  1.  Chest pain with history of coronary artery disease    Admission      Electronically signed by: Garrison Bhatia M.D., 2020 9:38 PM

## 2020-09-29 NOTE — ED NOTES
Report called to receiving RN - Angie - receiving RN had no questions or concerns and is in stable condition and ready for transport. Covid test sent

## 2020-09-29 NOTE — ED TRIAGE NOTES
"Presents complaining of acute onset of left chest pain with associated dyspnea at rest recurring since last night.  Chief Complaint   Patient presents with   • Chest Pain   • Shortness of Breath     /86   Pulse 75   Temp 36.1 °C (96.9 °F) (Temporal)   Resp 18   Ht 1.727 m (5' 8\")   Wt 81.6 kg (179 lb 14.3 oz)   SpO2 99%   BMI 27.35 kg/m²      "

## 2020-09-29 NOTE — ASSESSMENT & PLAN NOTE
-Observation status on telemetry  -Serial cardiac enzymes q 4 h x 3  -He is CP free at this time  -Prior h/o of STEMI in 2016 s/p failed angioplasty and thrombectomy.  -Stress test in am  -Full dose aspirin given  -Continue Carvedilol, lisinopril, atorvastatin and plavix  -Pain control with nitro and morphine PRN

## 2020-09-29 NOTE — PROGRESS NOTES
Telemetry Shift Summary     Rhythm SB/SR  HR Range 50s-89 (down to 45)  Ectopy rPVCs  Measurements 0.20/0.08/0.42    ST elevation present. This is unchanged from prior hospitalization.            Normal Values  Rhythm SR  HR Range    Measurements 0.12-0.20 / 0.06-0.10  / 0.30-0.52

## 2020-09-29 NOTE — H&P
Hospital Medicine History & Physical Note    Date of Service  9/28/2020    Primary Care Physician  ANDRÉS Velasquez    Consultants  None    Code Status  Full Code    Chief Complaint  Chief Complaint   Patient presents with   • Chest Pain   • Shortness of Breath       History of Presenting Illness  55 y.o. male, with h/o CAD and STEMI on 2016 s/p failed angioplasty and thrombectomy, who after hospitalization never followed up with cardiology due to lack of insurance,  who presented to the ED today on 9/28/2020 with Chest pain.    CHEST PAIN:  -Pains started this am  -Had at least 10 episodes today lasting a few minutes.   -Just prior coming to the ED, he describes a sharper pain rated as 8/10 in intensity, left side and associated with diaphoresis and sensations of near passing out, reason why he decided to come to the ED for further evaluation.    ER COURSE:  -Upon arrival to the ED, vitals are normal  -Laboratory is unremarkable with negative troponin  -CP free now  -EKG no acute ischemia        Review of Systems  Review of Systems   Constitutional: Negative for fever.   HENT: Negative for congestion and sore throat.    Eyes: Negative for blurred vision and double vision.   Respiratory: Negative for cough and shortness of breath.    Cardiovascular: Positive for chest pain. Negative for palpitations.   Gastrointestinal: Negative for nausea and vomiting.   Genitourinary: Negative for dysuria and urgency.   Musculoskeletal: Negative for myalgias and neck pain.   Skin: Negative for itching and rash.   Neurological: Negative for dizziness, weakness and headaches.   Endo/Heme/Allergies: Does not bruise/bleed easily.   Psychiatric/Behavioral: Negative for depression. The patient does not have insomnia.        Past Medical History   has a past medical history of CAD (coronary artery disease) and DDD (degenerative disc disease), lumbar (6/10/2009).    Surgical History   has a past surgical history that includes zzz  cardiac cath (1/31/16).     Family History  family history includes Diabetes in his mother; Heart Attack (age of onset: 70) in his father; Hypertension in his father.     Social History   reports that he quit smoking about 4 years ago. He quit after 10.00 years of use. He has never used smokeless tobacco. He reports that he does not drink alcohol or use drugs.    Allergies  No Known Allergies    Medications  Prior to Admission Medications   Prescriptions Last Dose Informant Patient Reported? Taking?   ibuprofen (MOTRIN) 600 MG Tab   No No   Sig: Take 1 Tab by mouth every 6 hours as needed.      Facility-Administered Medications: None       Physical Exam  Temp:  [36.1 °C (96.9 °F)] 36.1 °C (96.9 °F)  Pulse:  [75] 75  Resp:  [18] 18  BP: (116)/(86) 116/86  SpO2:  [99 %] 99 %    Physical Exam  Constitutional:       Appearance: Normal appearance.   HENT:      Head: Normocephalic and atraumatic.      Nose: Nose normal.      Mouth/Throat:      Mouth: Mucous membranes are moist.      Pharynx: Oropharynx is clear.   Eyes:      Extraocular Movements: Extraocular movements intact.      Pupils: Pupils are equal, round, and reactive to light.   Neck:      Musculoskeletal: Normal range of motion and neck supple.   Cardiovascular:      Rate and Rhythm: Normal rate and regular rhythm.      Pulses: Normal pulses.      Heart sounds: Normal heart sounds.   Pulmonary:      Effort: Pulmonary effort is normal.      Breath sounds: Normal breath sounds.   Abdominal:      General: Abdomen is flat. Bowel sounds are normal.      Palpations: Abdomen is soft.   Skin:     General: Skin is warm and dry.   Neurological:      General: No focal deficit present.      Mental Status: He is alert and oriented to person, place, and time.   Psychiatric:         Mood and Affect: Mood normal.         Behavior: Behavior normal.         Laboratory:  Recent Labs     09/28/20  1831   WBC 4.9   RBC 5.77   HEMOGLOBIN 17.2   HEMATOCRIT 50.5   MCV 87.5   MCH 29.8    MCHC 34.1   RDW 43.3   PLATELETCT 182   MPV 8.4*     Recent Labs     09/28/20  1831   SODIUM 139   POTASSIUM 4.2   CHLORIDE 98   CO2 32   GLUCOSE 123*   BUN 12   CREATININE 0.96   CALCIUM 9.3     Recent Labs     09/28/20  1831   ALTSGPT 21   ASTSGOT 17   ALKPHOSPHAT 76   TBILIRUBIN 1.0   GLUCOSE 123*         No results for input(s): NTPROBNP in the last 72 hours.      Recent Labs     09/28/20  1831 09/28/20  2058   TROPONINT 8 6       Imaging:  DX-CHEST-PORTABLE (1 VIEW)   Final Result         1.  No acute cardiopulmonary disease.        EKG: NSR @ 70 bpm, left axis deviation and no acute ST elevation. Inferior infarct age undetermined. V5-V6 with 1 mm ST elevation, that is chronic finding as compared to previous EKG    Assessment/Plan:  I anticipate this patient is appropriate for observation status at this time.    * Atypical chest pain  Assessment & Plan  -Observation status on telemetry  -Serial cardiac enzymes q 4 h x 3  -He is CP free at this time  -Prior h/o of STEMI in 2016 s/p failed angioplasty and thrombectomy.  -Stress test in am  -Full dose aspirin given  -Continue Carvedilol, lisinopril, atorvastatin and plavix  -Pain control with nitro and morphine PRN    HTN (hypertension)  Assessment & Plan  -Continue carvedilol and Lisinopril    Hyperlipemia- (present on admission)  Assessment & Plan  -Continue atorvastatin    Elevated glucose  Assessment & Plan  -Glucose on admission is 124. Ordered Hgb A1C      DVT Ppx: SCD's

## 2020-09-29 NOTE — CARE PLAN
Problem: Communication  Goal: The ability to communicate needs accurately and effectively will improve  Outcome: PROGRESSING AS EXPECTED  Intervention: Use communication aids and/or /Language Line as appropriate  Flowsheets (Taken 9/29/2020 1510)  Patient's Primary Language: Wolof  Note: A&Ox4. Wolof speaking  Mary for updates on POC, verbalized understanding.      Problem: Discharge Barriers/Planning  Goal: Patient's continuum of care needs will be met  Outcome: PROGRESSING AS EXPECTED  Intervention: Involve patient and significant other/support system in setting and prioritizing goals for hospital stay and discharge  Note: S/p stress test today. Per MD Julien, awaiting cards eval later this evening

## 2020-09-29 NOTE — CONSULTS
Cardiology Initial Consultation    Date of Service  9/29/2020    Referring Physician  Payton Julien M.D.    Reason for Consultation  CAD and abnormal stress test    History of Presenting Illness  Haily Gonzalez is an extremely pleasant 55 y.o. male who presented 9/28/2020 with chest pain, lightheadedness.  Has noted some intermittent chest tightness with activity that resolves with rest over the past few months that comes and goes.  Was sitting watching television, has some chest tightness, stood up quickly and felt lightheaded like is going to pass out so came to the emergency room.  Has not had recurrence of chest pain while here.  Rarely has palpitations.  No syncope.  No strokelike symptoms.    Had a late presentation inferior ST elevation MI in 2016, had mechanical thrombectomy, angioplasty and attempted intervention with PCI which was unsuccessful.  Had faint collateral flow.  Also had some LAD myocardial bridging but no mention of other significant CAD.  Had normal EF of 60% at that time.  Lost to follow-up since 2017 due to lack of insurance.  Has been taking aspirin, Plavix, atorvastatin, carvedilol and lisinopril.  Otherwise not been able to follow-up in our clinic, he is getting his medications filled through the [facility].    Had high-sensitivity troponin 6 and less than 6.  Nuclear stress test with predominantly fixed defect with minimal pericardial ischemia which would be consistent with collateral flow.    Has hypertension,  Has hyperlipidemia, has been on atorvastatin.  No updated lipids.  Father had MI at 70.  Quit smoking 2016.    Drinks 2 beers occasionally.  No significant alcohol abuse.     used via the iPad for Lao to English interpretation.    Review of Systems  Review of Systems   All other systems reviewed and are negative.      Past Medical History   has a past medical history of CAD (coronary artery disease) and DDD (degenerative disc disease), lumbar  (6/10/2009).    Surgical History   has a past surgical history that includes zzz cardiac cath (1/31/16).    Family History  family history includes Diabetes in his mother; Heart Attack (age of onset: 70) in his father; Hypertension in his father.    Social History   reports that he quit smoking about 4 years ago. He quit after 10.00 years of use. He has never used smokeless tobacco. He reports that he does not drink alcohol or use drugs.    Medications  Prior to Admission Medications   Prescriptions Last Dose Informant Patient Reported? Taking?   aspirin EC (ECOTRIN) 81 MG Tablet Delayed Response > 2 days at AM Patient Yes Yes   Sig: Take 81 mg by mouth every day.   atorvastatin (LIPITOR) 80 MG tablet 9/27/2020 at PM Patient's Home Pharmacy Yes Yes   Sig: Take 80 mg by mouth every evening.   carvedilol (COREG) 6.25 MG Tab 9/28/2020 at 1000 Patient's Home Pharmacy Yes Yes   Sig: Take 6.25 mg by mouth 2 times a day, with meals.   clopidogrel (PLAVIX) 75 MG Tab 9/28/2020 at 1000 Patient's Home Pharmacy Yes Yes   Sig: Take 75 mg by mouth every day.   lisinopril (PRINIVIL) 10 MG Tab 9/28/2020 at 1000 Patient's Home Pharmacy Yes Yes   Sig: Take 10 mg by mouth every day.      Facility-Administered Medications: None       Allergies  No Known Allergies    Vital signs in last 24 hours  Temp:  [36.1 °C (96.9 °F)-36.8 °C (98.2 °F)] 36.8 °C (98.2 °F)  Pulse:  [49-85] 74  Resp:  [11-18] 17  BP: (112-127)/(67-87) 113/67  SpO2:  [95 %-99 %] 97 %    Physical Exam  Physical Exam     General: No acute distress. Well nourished.  HEENT: EOM grossly intact, no scleral icterus, no pharyngeal erythema.   Neck:  No JVD, no bruits, trachea midline  CVS: RRR. Normal S1, S2. No M/R/G. No LE edema.  2+ radial pulses, 2+ PT pulses  Resp: CTAB. No wheezing or crackles/rhonchi. Normal respiratory effort.  Abdomen: Soft, NT, no ashlie hepatomegaly.  MSK/Ext: No clubbing or cyanosis.  Skin: Warm and dry, no rashes.  Neurological: CN III-XII grossly  intact. No focal deficits.   Psych: A&O x 3, appropriate affect, good judgement      Lab Review  Lab Results   Component Value Date/Time    WBC 5.0 09/29/2020 03:14 AM    RBC 5.22 09/29/2020 03:14 AM    HEMOGLOBIN 15.5 09/29/2020 03:14 AM    HEMATOCRIT 45.9 09/29/2020 03:14 AM    MCV 87.9 09/29/2020 03:14 AM    MCH 29.7 09/29/2020 03:14 AM    MCHC 33.8 09/29/2020 03:14 AM    MPV 8.7 (L) 09/29/2020 03:14 AM      Lab Results   Component Value Date/Time    SODIUM 139 09/29/2020 03:14 AM    POTASSIUM 4.0 09/29/2020 03:14 AM    CHLORIDE 100 09/29/2020 03:14 AM    CO2 24 09/29/2020 03:14 AM    GLUCOSE 87 09/29/2020 03:14 AM    BUN 10 09/29/2020 03:14 AM    CREATININE 0.84 09/29/2020 03:14 AM    CREATININE 0.9 05/02/2008 09:32 AM      Lab Results   Component Value Date/Time    ASTSGOT 17 09/28/2020 06:31 PM    ALTSGPT 21 09/28/2020 06:31 PM     Lab Results   Component Value Date/Time    CHOLSTRLTOT 188 02/01/2016 04:00 AM     (H) 02/01/2016 04:00 AM    HDL 45 02/01/2016 04:00 AM    TRIGLYCERIDE 201 (H) 02/01/2016 04:00 AM    TROPONINT <6 09/29/2020 03:14 AM       No results for input(s): NTPROBNP in the last 72 hours.    Cardiac Imaging and Procedures Review  EKG:  My personal interpretation of the EKG dated 9/29/2020 is sinus, 53, first-degree AV delay, old inferior MI    Echocardiogram: 12/29/2016  Normal left ventricular size, wall thickness, and systolic function.  Left ventricular ejection fraction is visually estimated to be 60%.  Trace mitral regurgitation.  Structurally normal aortic valve without significant stenosis or   regurgitation.  Estimated right ventricular systolic pressure  is 30 mmHg.  The right ventricle was normal in size and function.  Normal pericardium without effusion.    Cardiac Catheterization: 1/31/2016  IMPRESSION:  1.  Unsuccessful angioplasty and thrombectomy of the right coronary artery.  2.  Evidence of significant myocardial bridging in the mid left anterior   descending.  3.   Faint collateralization of the distal right coronary artery from the left   anterior descending.  4.  An 80% lesion at the origin of a small second diagonal artery.  5.  Contrast extravasation during the procedure, most compatible with   guidewire perforation without hemodynamic consequence.    Imaging  NM-CARDIAC STRESS TEST   Final Result      DX-CHEST-PORTABLE (1 VIEW)   Final Result         1.  No acute cardiopulmonary disease.          Stress Test: 9/29/2020   Normal left ventricular size, ejection fraction, and wall motion.   Basal and mid inferior wall prior infarct with mild francine-infarct ischemia.   Negative stress EKG, excellent functional capacity. Low duke treadmill score.   ECG INTERPRETATION   Negative stress EKG      Assessment/Plan  -CAD with recurrent angina  -Abnormal stress test consistent with prior infarct with minimal ischemia  -Hypertension  -Hyperlipidemia  -Former smoker  -Lightheadedness  -Rare palpitations      Plan:  -Normal high-sensitivity troponin is very reassuring that his chest pain has not led to significant cardiac compromise  -Reasonable to continue with medical management.  I had a long conversation with him about how to use nitroglycerin.  I did also offer attempt at heart catheterization but I think medical management is the next best course.  -If he is having ongoing lightheadedness or palpitations, we can explore this further in clinic  -Hopefully he will be able to follow-up with cash pay  -If he is using nitroglycerin frequently, we can add isosorbide mononitrate 30 mg.  -Needs updated cholesterol.  Ordered for tomorrow if he stays  -Continue lifelong dual antiplatelet therapy unless he has significant bleeding  -Continue statin  -Continue blood pressure control  -I will request outpatient follow-up in our clinic    Discussed with Dr. Payton Julien     Thank you for allowing me to participate in the care of this patient.      Please contact me with any questions.    Kristyn CA  LUNA Nichols.   Cardiologist, [facility]  [phone]

## 2020-09-29 NOTE — PROGRESS NOTES
Assumed care of pt w/ bedside report from LEONOR Adams. Pt resting comfortably in bed, no complaints offered. NPO for stress test. Fall precautions/safety maintained. Hourly rounding. Will continue to monitor.

## 2020-09-29 NOTE — CARE PLAN
Problem: Communication  Goal: The ability to communicate needs accurately and effectively will improve  Outcome: PROGRESSING AS EXPECTED  Note: Pt verbalizes understanding of plan of care. Denies any needs at this time.      Problem: Safety  Goal: Will remain free from falls  Outcome: PROGRESSING AS EXPECTED  Note: Pt verbalizes understanding of safety measures in place. Call light and personal belongings within reach. Clutter free environment.

## 2020-09-29 NOTE — PROGRESS NOTES
Med rec updated and complete  Allergies reviewed  Pt was not sure the strengths of his medications.  Called [facility] @ [phone] to verify all prescription.   Pt reports no vitamins   Pt and pts pharmacy reports no antibiotics in the last 2 weeks

## 2020-09-29 NOTE — ED NOTES
"Pt presents to ED for evaluation of CP - per pt pain started last night, reports standing up and feeling a \"cold sensation\" over his body. Pt also indicates not sleeping well and relates it to this pain. Pt reports while watching movie they had this pain come back, as it comes and goes, nothing makes it better or worse. Pt also notes that they had cardiac surgery in January 2016 at [facility]. Pt is Latvian speaking only. Denies any SOB with episodes of CP. A/Ox4. Stable condition at time. Pt on monitor.  "

## 2020-09-29 NOTE — DISCHARGE SUMMARY
Discharge Summary    CHIEF COMPLAINT ON ADMISSION  Chief Complaint   Patient presents with   • Chest Pain   • Shortness of Breath       Reason for Admission  chest pains, dizzy, numbness,      Admission Date  9/28/2020    CODE STATUS  Full Code    HPI & HOSPITAL COURSE  This is a 55 y.o. male with history of CAD.  He reportedly underwent a cardiac catheterization in 2016, at which time RCA thrombosis was noted and he reportedly had a stent placed for this.  Due to lack of insurance he did not follow up with cardiology.  He presented with intermittent exertional chest pain.  Troponins were within normal limits.  A stress test showed evidence of francine- infarct ischemia.  He was seen by cardiology and cleared for discharge, they will arrange follow up in their office.  I could not re evaluate his fasting lipids as he was not fasting and he agrees to have this re evaluated as an outpatient.  He has mild thrombocytopenia and agrees to have this followed up as an outpatient.       Therefore, he is discharged in fair and stable condition to home with close outpatient follow-up.    The patient recovered much more quickly than anticipated on admission.    Discharge Date  9/29/20    FOLLOW UP ITEMS POST DISCHARGE  Pcp  cardiology    DISCHARGE DIAGNOSES  Principal Problem:    Atypical chest pain POA: Unknown  Active Problems:    Hyperlipemia POA: Yes    HTN (hypertension) POA: Unknown    Elevated glucose POA: Unknown  Resolved Problems:    * No resolved hospital problems. *      FOLLOW UP  No future appointments.  ANDRÉS Velasquez  [address] [place] [id number] [phone]      per office request please call to schedule your hospital follow up. Thank you       MEDICATIONS ON DISCHARGE     Medication List      ASK your doctor about these medications      Instructions   aspirin EC 81 MG Tbec  Commonly known as: ECOTRIN   Take 81 mg by mouth every day.  Dose: 81 mg     atorvastatin 80 MG tablet  Commonly known as:  LIPITOR   Take 80 mg by mouth every evening.  Dose: 80 mg     carvedilol 6.25 MG Tabs  Commonly known as: COREG   Take 6.25 mg by mouth 2 times a day, with meals.  Dose: 6.25 mg     clopidogrel 75 MG Tabs  Commonly known as: PLAVIX   Take 75 mg by mouth every day.  Dose: 75 mg     lisinopril 10 MG Tabs  Commonly known as: PRINIVIL   Take 10 mg by mouth every day.  Dose: 10 mg            Allergies  No Known Allergies    DIET  Orders Placed This Encounter   Procedures   • Diet NPO     Standing Status:   Standing     Number of Occurrences:   1     Order Specific Question:   Restrict to:     Answer:   Sips with Medications [3]       ACTIVITY  As tolerated.  Weight bearing as tolerated    CONSULTATIONS  Cardiology    PROCEDURES  NM-CARDIAC STRESS TEST   Final Result      DX-CHEST-PORTABLE (1 VIEW)   Final Result         1.  No acute cardiopulmonary disease.            LABORATORY  Lab Results   Component Value Date    SODIUM 139 09/29/2020    POTASSIUM 4.0 09/29/2020    CHLORIDE 100 09/29/2020    CO2 24 09/29/2020    GLUCOSE 87 09/29/2020    BUN 10 09/29/2020    CREATININE 0.84 09/29/2020    CREATININE 0.9 05/02/2008        Lab Results   Component Value Date    WBC 5.0 09/29/2020    HEMOGLOBIN 15.5 09/29/2020    HEMATOCRIT 45.9 09/29/2020    PLATELETCT 152 (L) 09/29/2020        Total time of the discharge process exceeds 45 minutes.

## 2020-09-30 NOTE — PROGRESS NOTES
Pt discharged home per doctor's order.  Pt discharged home with daughter at 2022. Discharge paperwork reviewed.  Pt verbalized understanding of new prescription. Pt sent home with his belongings in hand.  Pt left walking with hospital escort.

## 2020-09-30 NOTE — DISCHARGE INSTRUCTIONS
Dolor de pecho inespecífico  Nonspecific Chest Pain  El dolor de pecho puede deberse a muchas afecciones diferentes. Algunas causas del dolor de pecho pueden ser potencialmente mortales. Estas requieren tratamiento inmediato. Algunas causas grave de dolor en el pecho son las siguientes:  · Infarto de miocardio.  · Un desgarro en el vaso sanguíneo principal del cuerpo.  · Enrojecimiento e hinchazón (inflamación) alrededor del corazón.  · Un coágulo de usha en los pulmones.  Otras causas de dolor en el pecho pueden no ser tan graves. Estos incluyen los siguientes:  · Acidez estomacal.  · Ansiedad o estrés.  · Daño en los huesos o músculos del corazón.  · Infecciones pulmonares.  El dolor de pecho puede provocar las siguientes sensaciones:  · Dolor o molestias en el pecho.  · Dolor opresivo, continuo o constrictivo.  · Ardor u hormigueo.  · Dolor sordo o intenso que empeora al moverse, toser o inhalar profundamente.  · Dolor o molestias que también se sienten en la espalda, el rosendo, la mandíbula, el hombro o el brazo, o dolor que se extiende a cualquiera de estas zonas.  Es difícil saber si la causa del dolor es algo grave o algo que no es tan grave. Por lo tanto, es importante que consulte al médico inmediatamente si tiene dolor en el pecho.  Siga estas indicaciones en gamez casa:  Medicamentos  · [place] los medicamentos de venta alberto y los recetados solamente lucho se lo haya indicado el médico.  · Si le recetaron un antibiótico, tómelo lucho se lo haya indicado el médico. No deje de brandon los antibióticos aunque comience a sentirse mejor.  Estilo de carlos manuel    · Amrik reposo lucho se lo haya indicado el médico.  · No consuma ningún producto que contenga nicotina o tabaco, lucho cigarrillos, cigarrillos electrónicos y tabaco de mascar. Si necesita ayuda para dejar de fumar, consulte al médico.  · No adrienne alcohol.  · Amrik cambios en gamez estilo de carlos manuel según las indicaciones del médico. Estos pueden incluir lo  siguiente:  ? Practicar actividad física con regularidad. Pregúntele al médico qué actividades son seguras para usted.  ? Seguir fabio dieta cardiosaludable. Un especialista en dietas y alimentación (nutricionista) puede ayudarlo a que leoncio elecciones saludables.  ? Mantener un peso saludable.  ? Tratar la diabetes o la presión arterial tank, si es necesario.  ? Reducir el estrés. Las actividades lucho el yoga y las técnicas de relajación pueden ayudar.  Indicaciones generales  · Esté atento a cualquier cambio en los síntomas. Informe a gamez médico si presenta algún cambio en los síntomas o si aparecen síntomas nuevos.  · Evite las actividades que le causen dolor de pecho.  · Concurra a todas las visitas de seguimiento lucho se lo haya indicado el médico. [place] es importante. Es posible que tenga que someterse a más estudios si el dolor de pecho no desaparece.  Comuníquese con un médico si:  · El dolor de pecho no desaparece.  · Se siente deprimido.  · Tiene fiebre.  Solicite ayuda inmediatamente si:  · El dolor en el pecho es más intenso.  · Tiene tos que empeora o tose con usha.  · Tiene dolor muy intenso en el vientre (abdomen).  · Pierde el conocimiento (se desmaya).  · Tiene cualquiera de estos síntomas sin ninguna causa stephen:  ? Malestar repentino en el pecho.  ? Molestias repentinas en los brazos, la espalda, el rosendo o la mandíbula.  · Le falta el aire en cualquier momento.  · Comienza a sudar de manera repentina o la piel se le humedece.  · Siente malestar estomacal (náuseas).  · Vomita.  · Se siente repentinamente mareado o se desmaya.  · Se siente muy débil o cansado.  · El corazón comienza a latirle rápidamente o parece que se saltea latidos.  Estos síntomas pueden indicar fabio emergencia. No espere a columba si los síntomas desaparecen. Solicite atención médica de inmediato. Comuníquese con el servicio de emergencias de gamez localidad (911 en los [place]). No conduzca por bon propios medios hasta el  hospital.  Resumen  · El dolor de pecho puede deberse a muchas afecciones diferentes. La causa puede ser grave y requerir tratamiento de inmediato. Si tiene dolor de pecho, consulte al médico de inmediato.  · Siga las indicaciones del médico para brandon los medicamentos y hacer cambios en gamez estilo de carlos manuel.  · Concurra a todas las visitas de seguimiento lucho se lo haya indicado el médico. [place] incluye las visitas para realizarle otros estudios si el dolor de pecho no desaparece.  · Asegúrese de conocer los signos que indican que gamez afección ha empeorado. Obtenga ayuda de inmediato si tiene esos síntomas.  Esta información no tiene lucho fin reemplazar el consejo del médico. Asegúrese de hacerle al médico cualquier pregunta que tenga.  Document Released: 03/16/2010 Document Revised: 08/19/2019 Document Reviewed: 08/19/2019  [employer] Patient Education © 2020 [employer]        Nitroglycerin mouth spray  ¿Qué es raquel medicamento?  La NITROGLICERINA es un tipo de vasodilatador. Relaja los vasos sanguíneos e incrementa así el suministro de usha y oxígeno al corazón. Raquel medicamento se utiliza para prevenir o aliviar el dolor en el pecho causado por la angina de pecho.  Raquel medicamento puede ser utilizado para otros usos; si tiene alguna pregunta consulte con gamez proveedor de atención médica o con gamez farmacéutico.  MARCAS COMUNES: Nitrolingual, NitroMist  ¿Qué le mikhail informar a mi profesional de la fuentes antes de brandon raquel medicamento?  Necesita saber si usted presenta alguno de los siguientes problemas o situaciones:  · enfermedad hepática  · ataque cardiaco reciente  · fabio reacción alérgica o inusual a la nitroglicerina, a otros medicamentos, alimentos, colorantes o conservantes  · si está embarazada o buscando quedar embarazada  · si está amamantando a un bebé  ¿Cómo mikhail utilizar raquel medicamento?  Raquel medicamento es para utilizarse solamente en la boca. Utílicelo al sentir los primeros signos de un ataque.  También puede usar any medicamento 5 ó 10 minutos antes de fabio situación que pueda provocarle dolor en el pecho. Siga las instrucciones de la etiqueta del medicamento. No agite el recipiente. Quite la tapa plástica. Antes de usar any medicamento por la primera vez, debe preparar el recipiente. Rociar la bomba 5 veces en el aire lejos de usted. Si no ha usado any medicamento en 6 semanas, debe preparar el recipiente otra vez rociando fabio vez al aire lejos de usted. Mantenga el recipiente en posición vertical y aplique sobre o debajo de la lengua. No inhale malia la aplicación. Luego de cada zelalem, cierre la boca y no trague ni enjuague. Karlene síntomas deberán mejorar entre 1 y 5 minutos. Puede brandon hasta murali dosis a intervalos de 5 minutos entre fabio dosis y la siguiente. Si no se siente mejor luego de 1 dosis, consulte a gamez médico o a gamez profesional de la fuentes inmediatamente o leoncio que alguien lo lleve directamente a fabio candace de emergencias. No utilice el medicamento con fabio frecuencia mayor a la indicada.  Si tome any medicamento a menudo para aliviar los síntomas de angina, gamez médico o gamez profesional de la fuentes le pueden kerwin intrucciones diferentes para manejar karlene síntomas. Si karlene síntomas no desaparecen después de seguir estas instrucciones, es importante llamar al 9-1-1 inmediatamente. No tome más de 3 dosis en el transcurro de 15 minutos.  Hable con gamez pediatra para informarse acerca del uso de any medicamento en niños. Puede requerir atención especial.  Sobredosis: Póngase en contacto inmediatamente con un centro toxicológico o fabio candace de urgencia si usted jonelle que haya tomado demasiado medicamento.  ATENCIÓN: Any medicamento es solo para usted. No comparta any medicamento con nadie.  ¿Qué sucede si me olvido de fabio dosis?  No se aplica en any jasmina. Any medicamento es sólo para usar lucho sea necesario.  ¿Qué puede interactuar con any medicamento?  No tome esta medicina con ninguno de los  siguientes medicamentos:  · ciertos medicamentos para migrañas, tales lucho ergotamina y dihidroergotamina  · medicamentos utilizados para tratar disfunción erectil, tales lucho avanafil, sildenafil, tadalafil y vardenafil  · riociguat  Esta medicina también puede interactuar con los siguientes medicamentos:  · medicamentos para la tank presión sanguínea  Puede ser que esta lista no menciona todas las posibles interacciones. Informe a gamez profesional de la fuentes de todos los productos a base de hierbas, medicamentos de venta alberto o suplementos nutritivos que esté tomando. Si usted fuma, consume bebidas alcohólicas o si utiliza drogas ilegales, indíqueselo también a gamez profesional de la fuentes. Algunas sustancias pueden interactuar con gamez medicamento.  ¿A qué mikhail estar atento al usar any medicamento?  Informe a gamez médico o a gamez profesional de la fuentes si observa que el medicamento ya no surte efecto.  Lleve any medicamento consigo en todo momento. Siéntese o recuéstese para evitar caerse si siente mareos o sensación de desmayo luego de ingerir any medicamento. Intente permanecer en calma. [place] le ayudará a sentirse mejor más rápidamente. Si siente mareos, respire profundamente varias veces y acuéstese con los pies apoyados hacia arriba o inclínese hacia adelante y coloque la omar entre las rodillas.  Puede experimentar mareos o somnolencia. No conduzca ni utilice maquinaria ni leoncio nada que le exija permanecer en estado de alerta hasta que sepa cómo le afecta any medicamento. No se siente ni se ponga de pie con rapidez, especialmente si es un paciente de edad avanzada. [place] reduce el riesgo de mareos o desmayos. El alcohol puede aumentar los mareos y la somnolencia. Evite consumir bebidas alcohólicas.  No se trate usted mismo si tiene tos, resfrío o dolor mientras está tomando any medicamento sin consultar con gamez médico o con gamez profesional de la fuentes. Algunos ingredientes pueden aumentar la presión  sanguínea.  ¿Qué efectos secundarios puedo tener al utilizar any medicamento?  Efectos secundarios que debe informar a gamez médico o a gamez profesional de la fuentes tan pronto lucho sea posible:  · visión borrosa  · boca seca  · erupción cutánea  · sudoración  · sensación de presión extrema en la omar  · cansancio o debilidad inusual  Efectos secundarios que, por lo general, no requieren atención médica (debe informarlos a gamez médico o a gamez profesional de la fuentes si persisten o si son molestos):  · enrojecimiento de la jose j o rosendo  · dolor de omar  · latidos cardiacos irregulares, palpitaciones  · náuseas, vómito  Puede ser que esta lista no menciona todos los posibles efectos secundarios. Comuníquese a gamez médico por asesoramiento médico sobre los efectos secundarios. Usted puede informar los efectos secundarios a la FDA por teléfono al [phone]-[phone].  ¿Dónde mikhail guardar mi medicina?  Manténgala fuera del alcance de los niños.  Guárdela a temperatura ambiente, entre 15 y 30 grados C (59 y 86 grados F). No rociar cerca de ott. No queme ni gerardo el recipiente a la fuerza. Deseche todo el medicamento que no haya utilizado, después de la fecha de vencimiento.  ATENCIÓN: Any folleto es un resumen. Puede ser que no cubra toda la posible información. Si usted tiene preguntas acerca de esta medicina, consulte con gamez médico, gamez farmacéutico o gamez profesional de la fuentes.  © 2020 Elsevier/Gold Standard (2016-02-09 00:00:00)    Discharge Instructions    Discharged to home by car with self. Discharged via wheelchair, hospital escort: Yes.  Special equipment needed: Not Applicable    Be sure to schedule a follow-up appointment with your primary care doctor or any specialists as instructed.     Discharge Plan:   Diet Plan: Discussed  Activity Level: Discussed  Confirmed Follow up Appointment: Patient to Call and Schedule Appointment  Confirmed Symptoms Management: Discussed  Medication Reconciliation Updated: Yes  Influenza  Vaccine Indication: Indicated: 9 to 64 years of age    I understand that a diet low in cholesterol, fat, and sodium is recommended for good health. Unless I have been given specific instructions below for another diet, I accept this instruction as my diet prescription.   Other diet: cardiac    Special Instructions: None    · Is patient discharged on Warfarin / Coumadin?   No     Depression / Suicide Risk    As you are discharged from this [facility] facility, it is important to learn how to keep safe from harming yourself.    Recognize the warning signs:  · Abrupt changes in personality, positive or negative- including increase in energy   · Giving away possessions  · Change in eating patterns- significant weight changes-  positive or negative  · Change in sleeping patterns- unable to sleep or sleeping all the time   · Unwillingness or inability to communicate  · Depression  · Unusual sadness, discouragement and loneliness  · Talk of wanting to die  · Neglect of personal appearance   · Rebelliousness- reckless behavior  · Withdrawal from people/activities they love  · Confusion- inability to concentrate     If you or a loved one observes any of these behaviors or has concerns about self-harm, here's what you can do:  · Talk about it- your feelings and reasons for harming yourself  · Remove any means that you might use to hurt yourself (examples: pills, rope, extension cords, firearm)  · Get professional help from the community (Mental Health, Substance Abuse, psychological counseling)  · Do not be alone:Call your Safe Contact- someone whom you trust who will be there for you.  · Call your local CRISIS HOTLINE 986-5627 or 359-235-3209  · Call your local Children's Mobile Crisis Response Team [place] (921) 782-4135 or www.Mr. Number  · Call the toll free National Suicide Prevention Hotlines   · National Suicide Prevention Lifeline 794-404-PBEW (8670)  · National Hope Line Network 800-UCFGHHV  (963-0831)

## 2020-09-30 NOTE — PROGRESS NOTES
Bedside report given to LEONOR Delgado. POC discussed, all questions answered. Safety precautions in place. Care released.

## 2020-09-30 NOTE — PROGRESS NOTES
Telemetry Shift Summary    Rhythm SB/SR  HR Range 50s-70s  Ectopy R-PVC, ST elevation  Measurements 0.18/0.08/0.44        Normal Values  Rhythm SR  HR Range    Measurements 0.12-0.20 / 0.06-0.10  / 0.30-0.52

## 2020-11-07 ENCOUNTER — HOSPITAL ENCOUNTER (EMERGENCY)
Facility: MEDICAL CENTER | Age: 55
End: 2020-11-07
Attending: EMERGENCY MEDICINE
Payer: OTHER GOVERNMENT

## 2020-11-07 ENCOUNTER — APPOINTMENT (OUTPATIENT)
Dept: RADIOLOGY | Facility: MEDICAL CENTER | Age: 55
End: 2020-11-07
Payer: OTHER GOVERNMENT

## 2020-11-07 VITALS
BODY MASS INDEX: 27.58 KG/M2 | TEMPERATURE: 97.7 F | DIASTOLIC BLOOD PRESSURE: 72 MMHG | OXYGEN SATURATION: 97 % | RESPIRATION RATE: 18 BRPM | HEIGHT: 67 IN | HEART RATE: 69 BPM | SYSTOLIC BLOOD PRESSURE: 120 MMHG | WEIGHT: 175.71 LBS

## 2020-11-07 DIAGNOSIS — R05.9 COUGH: ICD-10-CM

## 2020-11-07 DIAGNOSIS — Z20.822 SUSPECTED COVID-19 VIRUS INFECTION: ICD-10-CM

## 2020-11-07 LAB
ALBUMIN SERPL BCP-MCNC: 3.9 G/DL (ref 3.2–4.9)
ALBUMIN/GLOB SERPL: 1.1 G/DL
ALP SERPL-CCNC: 76 U/L (ref 30–99)
ALT SERPL-CCNC: 21 U/L (ref 2–50)
ANION GAP SERPL CALC-SCNC: 14 MMOL/L (ref 7–16)
AST SERPL-CCNC: 25 U/L (ref 12–45)
BASOPHILS # BLD AUTO: 0.3 % (ref 0–1.8)
BASOPHILS # BLD: 0.01 K/UL (ref 0–0.12)
BILIRUB SERPL-MCNC: 1 MG/DL (ref 0.1–1.5)
BUN SERPL-MCNC: 15 MG/DL (ref 8–22)
CALCIUM SERPL-MCNC: 8.5 MG/DL (ref 8.4–10.2)
CHLORIDE SERPL-SCNC: 98 MMOL/L (ref 96–112)
CO2 SERPL-SCNC: 23 MMOL/L (ref 20–33)
COVID ORDER STATUS COVID19: NORMAL
CREAT SERPL-MCNC: 0.9 MG/DL (ref 0.5–1.4)
EKG IMPRESSION: NORMAL
EOSINOPHIL # BLD AUTO: 0 K/UL (ref 0–0.51)
EOSINOPHIL NFR BLD: 0 % (ref 0–6.9)
ERYTHROCYTE [DISTWIDTH] IN BLOOD BY AUTOMATED COUNT: 41.8 FL (ref 35.9–50)
GLOBULIN SER CALC-MCNC: 3.4 G/DL (ref 1.9–3.5)
GLUCOSE SERPL-MCNC: 110 MG/DL (ref 65–99)
HCT VFR BLD AUTO: 45.5 % (ref 42–52)
HGB BLD-MCNC: 15.7 G/DL (ref 14–18)
IMM GRANULOCYTES # BLD AUTO: 0.01 K/UL (ref 0–0.11)
IMM GRANULOCYTES NFR BLD AUTO: 0.3 % (ref 0–0.9)
LYMPHOCYTES # BLD AUTO: 0.97 K/UL (ref 1–4.8)
LYMPHOCYTES NFR BLD: 27.7 % (ref 22–41)
MCH RBC QN AUTO: 29.6 PG (ref 27–33)
MCHC RBC AUTO-ENTMCNC: 34.5 G/DL (ref 33.7–35.3)
MCV RBC AUTO: 85.8 FL (ref 81.4–97.8)
MONOCYTES # BLD AUTO: 0.37 K/UL (ref 0–0.85)
MONOCYTES NFR BLD AUTO: 10.6 % (ref 0–13.4)
NEUTROPHILS # BLD AUTO: 2.14 K/UL (ref 1.82–7.42)
NEUTROPHILS NFR BLD: 61.1 % (ref 44–72)
NRBC # BLD AUTO: 0 K/UL
NRBC BLD-RTO: 0 /100 WBC
PLATELET # BLD AUTO: 152 K/UL (ref 164–446)
PMV BLD AUTO: 8.7 FL (ref 9–12.9)
POTASSIUM SERPL-SCNC: 4.3 MMOL/L (ref 3.6–5.5)
PROT SERPL-MCNC: 7.3 G/DL (ref 6–8.2)
RBC # BLD AUTO: 5.3 M/UL (ref 4.7–6.1)
SARS-COV-2 RNA RESP QL NAA+PROBE: DETECTED
SODIUM SERPL-SCNC: 135 MMOL/L (ref 135–145)
SPECIMEN SOURCE: ABNORMAL
TROPONIN T SERPL-MCNC: 7 NG/L (ref 6–19)
WBC # BLD AUTO: 3.5 K/UL (ref 4.8–10.8)

## 2020-11-07 PROCEDURE — 71045 X-RAY EXAM CHEST 1 VIEW: CPT

## 2020-11-07 PROCEDURE — 85025 COMPLETE CBC W/AUTO DIFF WBC: CPT

## 2020-11-07 PROCEDURE — 84484 ASSAY OF TROPONIN QUANT: CPT

## 2020-11-07 PROCEDURE — C9803 HOPD COVID-19 SPEC COLLECT: HCPCS | Performed by: EMERGENCY MEDICINE

## 2020-11-07 PROCEDURE — 80053 COMPREHEN METABOLIC PANEL: CPT

## 2020-11-07 PROCEDURE — U0003 INFECTIOUS AGENT DETECTION BY NUCLEIC ACID (DNA OR RNA); SEVERE ACUTE RESPIRATORY SYNDROME CORONAVIRUS 2 (SARS-COV-2) (CORONAVIRUS DISEASE [COVID-19]), AMPLIFIED PROBE TECHNIQUE, MAKING USE OF HIGH THROUGHPUT TECHNOLOGIES AS DESCRIBED BY CMS-2020-01-R: HCPCS

## 2020-11-07 PROCEDURE — 93005 ELECTROCARDIOGRAM TRACING: CPT

## 2020-11-07 PROCEDURE — 99283 EMERGENCY DEPT VISIT LOW MDM: CPT

## 2020-11-07 ASSESSMENT — FIBROSIS 4 INDEX: FIB4 SCORE: 1.37

## 2020-11-07 NOTE — ED PROVIDER NOTES
"ED Provider Note    ER Provider Note         CHIEF COMPLAINT  Chief Complaint   Patient presents with   • Cough   • Chest Pain       HPI  Haily Gonzalez is a 55 y.o. male who presents to the Emergency Department with cough as well as chills and fatigue.  Is to be on for 10 days.  The patient says his partner has now gotten sick with similar symptoms.  He does have some chest pain associated with this.  He does have a history of coronary artery disease.  The chest pain only comes on with coughing.  There is no nausea or vomiting.  He says his appetite is decreased.  His smell is also decreased as well.    REVIEW OF SYSTEMS  See HPI for further details. All other systems are negative.     PAST MEDICAL HISTORY   has a past medical history of CAD (coronary artery disease) and DDD (degenerative disc disease), lumbar (6/10/2009).    SURGICAL HISTORY   has a past surgical history that includes zzz cardiac cath (15).    SOCIAL HISTORY  Social History     Tobacco Use   • Smoking status: Former Smoker     Years: 10.00     Quit date: 2016     Years since quittin.7   • Smokeless tobacco: Never Used   Substance Use Topics   • Alcohol use: No   • Drug use: No      Social History     Substance and Sexual Activity   Drug Use No       FAMILY HISTORY  Family History   Problem Relation Age of Onset   • Diabetes Mother    • Hypertension Father    • Heart Attack Father 70        CAd       CURRENT MEDICATIONS  Home Medications    **Home medications have not yet been reviewed for this encounter**         ALLERGIES  No Known Allergies    PHYSICAL EXAM  VITAL SIGNS: /67   Pulse 67   Temp 36.5 °C (97.7 °F) (Temporal)   Resp 18   Ht 1.702 m (5' 7\")   Wt 79.7 kg (175 lb 11.3 oz)   SpO2 95%   BMI 27.52 kg/m²      Constitutional: Alert in no apparent distress.  HENT: No signs of trauma, Bilateral external ears normal, Nose normal.   Eyes: Pupils are equal and reactive, Conjunctiva normal, Non-icteric.   Neck: " Normal range of motion, No tenderness, Supple, No stridor.   Lymphatic: No lymphadenopathy noted.   Cardiovascular: Regular rate and rhythm, nondisplaced PMI  Thorax & Lungs: No respiratory distress,  No chest tenderness.   Abdomen: Bowel sounds normal, Soft, No tenderness, No masses, No pulsatile masses. No peritoneal signs.  Skin: Warm, Dry, No erythema, No rash.   Back: No bony tenderness, No CVA tenderness.   Extremities: Intact distal pulses, No edema, No tenderness, No cyanosis.  Musculoskeletal: Good range of motion in all major joints. No tenderness to palpation or major deformities noted.   Neurologic: Alert , Normal motor function, Normal sensory function, No focal deficits noted.   Psychiatric: Affect normal, Judgment normal, Mood normal.     DIAGNOSTIC STUDIES / PROCEDURES    EKG Interpretation:  Interpreted by me  Normal sinus rhythm at a rate of 74 with an inferior infarct, There is no ST elevation or depression.        LABS  Labs Reviewed   CBC WITH DIFFERENTIAL - Abnormal; Notable for the following components:       Result Value    WBC 3.5 (*)     Platelet Count 152 (*)     MPV 8.7 (*)     Lymphs (Absolute) 0.97 (*)     All other components within normal limits   COMP METABOLIC PANEL - Abnormal; Notable for the following components:    Glucose 110 (*)     All other components within normal limits   TROPONIN   COVID/SARS COV-2    Narrative:     Collected By:[id number] TERESITA QUINTANA  Is patient being admitted?->No  Expected turn around time?->Routine (In-House PCR up to 24  hours)  Is this the patients First SARS CoV-2 test?->Unknown  Is this patient employed in healthcare?->Unknown  Is the patient symptomatic as defined by the CDC?->Unknown  Is the patient hospitalized?->Unknown  Is the patient a resident in a congregate care  setting?->Unknown  Is the patient pregnant?->Unknown   ESTIMATED GFR   SARS-COV-2, PCR (IN-HOUSE)    Narrative:     Collected By:[id number] TERESITA QUINTANA  Is patient being  admitted?->No  Expected turn around time?->Routine (In-House PCR up to 24  hours)  Is this the patients First SARS CoV-2 test?->Unknown  Is this patient employed in healthcare?->Unknown  Is the patient symptomatic as defined by the CDC?->Unknown  Is the patient hospitalized?->Unknown  Is the patient a resident in a congregate care  setting?->Unknown  Is the patient pregnant?->Unknown       All labs reviewed by me.    RADIOLOGY  DX-CHEST-PORTABLE (1 VIEW)   Final Result         1.  No focal infiltrates.   2.  Perihilar interstitial prominence and bronchial wall cuffing, appearance suggests changes of underlying bronchial inflammation, consider bronchitis.           The radiologist's interpretation of all radiological studies have been reviewed by me.    COURSE & MEDICAL DECISION MAKING  Pertinent Labs & Imaging studies reviewed. (See chart for details)    This is a 55 y.o. male that presents with what appears to be a viral syndrome.  I believe this is likely COVID-19.  We will get swab of this.  We will do chest x-ray to evaluate for pneumonia versus pneumothorax.  On his EKG he does have cold Q waves.  I believe this is an acute injury.  The patient has no ST elevation or depression.  We will get a single troponin.  We will reassess after this.    3:41 AM - Patient seen and examined at bedside.    5:33 AM- Patient was found to have potential bronchitis on chest x-ray with no consolidation.  Covid swab is pending.  The patient has mild leukopenia 3.5.  Is not anemic.  Platelets are low at 152.  There is no electrolyte derangement.  At this time I believe the patient does have Covid.  I will send him home with strict return precautions and instructions to isolate.  EKG is nonischemic and troponin is negative as well.        FINAL IMPRESSION  1. Cough    2. Suspected COVID-19 virus infection              Electronically signed by: Homer Braswell M.D., 11/7/2020

## 2020-11-07 NOTE — ED NOTES
Pt discharged home per provider in stable condition. Paperwork provided to pt via RN and discharge instructions went over with by RN - pt verbalized understanding of teaching with no questions or concerns and is A/Ox4, VSS. Paperwork in hand on discharge.    Paperwork in hand on discharge. Ambulatory out. Pt had no questions or concerns. IV out. Notified will get results of test called to them or mychart notifications.

## 2020-11-07 NOTE — ED TRIAGE NOTES
Patient c/o pleuritic CP, malaise, and productive cough for 10 days. Denies fever or contact with anyone with covid. Patient has cardiac hx

## 2020-11-07 NOTE — LETTER
11/8/2020               Haily Shaka Gonzalez  [address] [place] [address]        Dear Atifjeremiah (MR#[id number]),    This letter is to inform you that your COVID-19 test result is POSITIVE.  This means that the virus that causes COVID-19 was found in your sample.      The Health Department will be in contact with you soon.      You are encouraged to continue to quarantine and self-isolate according to the CDC guidance unless otherwise directed by the Health Department.  Per the CDC, you should continue to quarantine until at least 10 days have passed since your symptoms first appeared and at least 24 hours have passed since your fever resolved without the use of fever-reducing medications. Your other symptoms of COVID-19 should also be improving (loss of taste and smell may persist for weeks or months after recovery and should not delay the end of isolation).    Once any symptoms have resolved, it may be possible to donate plasma to help others that are currently ill with COVID-19. To learn more and apply, please contact the  at [phone] or via e-mail at [email or web address].    For any further questions regarding COVID-19, please contact the [facility] at [phone].  Thank you for your cooperation in the matter.      Sincerely,      [facility] Care Team

## 2020-11-08 NOTE — ED NOTES
COVID-19 Test Follow-Up  11/08/20    Patient is positive for COVID-19.      SARS-CoV-2 Source   Date Value Ref Range Status   11/07/2020 NP Swab  Final     SARS-CoV-2 by PCR   Date Value Ref Range Status   11/07/2020 DETECTED (AA)  Final     Comment:     **The TaqPath COVID-19 SARS-CoV-2 test has been made available for use under  the Emergency Use Authorization (EUA) only.       Patient does not have her voicemail set up. I have informed the patient of the positive result by mail and that the Health Dept would be in contact soon. Instructed them to continue to quarantine and self-isolate according with the CDC guidance or as otherwise directed by the Health Dept.    Per the CDC, they should continue to self-isolate until:   • At least 10 days since symptoms first appeared and  • At least 24 hours with no fever without fever-reducing medication and  • Other symptoms of COVID-19 are improving (Loss of taste and smell may persist for weeks or months after recovery and need not delay the end of isolation)    They are advised to return to the ER for worsening symptoms including difficulty breathing, ongoing fever, weakness or chest pain.    Desirae Mcdonnell, PharmD

## 2021-03-15 DIAGNOSIS — Z23 NEED FOR VACCINATION: ICD-10-CM

## 2021-09-25 ENCOUNTER — APPOINTMENT (OUTPATIENT)
Dept: RADIOLOGY | Facility: MEDICAL CENTER | Age: 56
End: 2021-09-25
Attending: EMERGENCY MEDICINE

## 2021-09-25 ENCOUNTER — APPOINTMENT (OUTPATIENT)
Dept: CARDIOLOGY | Facility: MEDICAL CENTER | Age: 56
End: 2021-09-25
Attending: GENERAL PRACTICE

## 2021-09-25 ENCOUNTER — HOSPITAL ENCOUNTER (OUTPATIENT)
Facility: MEDICAL CENTER | Age: 56
End: 2021-09-26
Attending: EMERGENCY MEDICINE | Admitting: GENERAL PRACTICE

## 2021-09-25 DIAGNOSIS — I25.10 CORONARY ARTERY DISEASE DUE TO LIPID RICH PLAQUE: ICD-10-CM

## 2021-09-25 DIAGNOSIS — R07.9 ACUTE CHEST PAIN: ICD-10-CM

## 2021-09-25 DIAGNOSIS — I25.83 CORONARY ARTERY DISEASE DUE TO LIPID RICH PLAQUE: ICD-10-CM

## 2021-09-25 DIAGNOSIS — I25.119 CORONARY ARTERY DISEASE INVOLVING NATIVE CORONARY ARTERY OF NATIVE HEART WITH ANGINA PECTORIS (HCC): ICD-10-CM

## 2021-09-25 PROBLEM — E87.6 HYPOKALEMIA: Status: ACTIVE | Noted: 2021-09-25

## 2021-09-25 LAB
ALBUMIN SERPL BCP-MCNC: 4.8 G/DL (ref 3.2–4.9)
ALBUMIN/GLOB SERPL: 1.7 G/DL
ALP SERPL-CCNC: 68 U/L (ref 30–99)
ALT SERPL-CCNC: 22 U/L (ref 2–50)
ANION GAP SERPL CALC-SCNC: 12 MMOL/L (ref 7–16)
APPEARANCE UR: CLEAR
AST SERPL-CCNC: 20 U/L (ref 12–45)
BASOPHILS # BLD AUTO: 0.5 % (ref 0–1.8)
BASOPHILS # BLD: 0.03 K/UL (ref 0–0.12)
BILIRUB SERPL-MCNC: 0.9 MG/DL (ref 0.1–1.5)
BILIRUB UR QL STRIP.AUTO: NEGATIVE
BUN SERPL-MCNC: 11 MG/DL (ref 8–22)
CALCIUM SERPL-MCNC: 9.1 MG/DL (ref 8.4–10.2)
CHLORIDE SERPL-SCNC: 97 MMOL/L (ref 96–112)
CHOLEST SERPL-MCNC: 155 MG/DL (ref 100–199)
CO2 SERPL-SCNC: 25 MMOL/L (ref 20–33)
COLOR UR: NORMAL
CREAT SERPL-MCNC: 0.96 MG/DL (ref 0.5–1.4)
EKG IMPRESSION: NORMAL
EOSINOPHIL # BLD AUTO: 0.13 K/UL (ref 0–0.51)
EOSINOPHIL NFR BLD: 2.1 % (ref 0–6.9)
ERYTHROCYTE [DISTWIDTH] IN BLOOD BY AUTOMATED COUNT: 44.3 FL (ref 35.9–50)
EST. AVERAGE GLUCOSE BLD GHB EST-MCNC: 111 MG/DL
GLOBULIN SER CALC-MCNC: 2.9 G/DL (ref 1.9–3.5)
GLUCOSE SERPL-MCNC: 93 MG/DL (ref 65–99)
GLUCOSE UR STRIP.AUTO-MCNC: NEGATIVE MG/DL
HBA1C MFR BLD: 5.5 % (ref 4–5.6)
HCT VFR BLD AUTO: 48.7 % (ref 42–52)
HDLC SERPL-MCNC: 46 MG/DL
HGB BLD-MCNC: 16.5 G/DL (ref 14–18)
HIV 1+2 AB+HIV1 P24 AG SERPL QL IA: NORMAL
IMM GRANULOCYTES # BLD AUTO: 0.01 K/UL (ref 0–0.11)
IMM GRANULOCYTES NFR BLD AUTO: 0.2 % (ref 0–0.9)
KETONES UR STRIP.AUTO-MCNC: NEGATIVE MG/DL
LDLC SERPL CALC-MCNC: 65 MG/DL
LEUKOCYTE ESTERASE UR QL STRIP.AUTO: NEGATIVE
LIPASE SERPL-CCNC: 38 U/L (ref 7–58)
LV EJECT FRACT  99904: 65
LV EJECT FRACT MOD 2C 99903: 55.24
LV EJECT FRACT MOD 4C 99902: 73.43
LV EJECT FRACT MOD BP 99901: 66.57
LYMPHOCYTES # BLD AUTO: 3.37 K/UL (ref 1–4.8)
LYMPHOCYTES NFR BLD: 54.6 % (ref 22–41)
MCH RBC QN AUTO: 29.9 PG (ref 27–33)
MCHC RBC AUTO-ENTMCNC: 33.9 G/DL (ref 33.7–35.3)
MCV RBC AUTO: 88.4 FL (ref 81.4–97.8)
MICRO URNS: NORMAL
MONOCYTES # BLD AUTO: 0.59 K/UL (ref 0–0.85)
MONOCYTES NFR BLD AUTO: 9.6 % (ref 0–13.4)
NEUTROPHILS # BLD AUTO: 2.04 K/UL (ref 1.82–7.42)
NEUTROPHILS NFR BLD: 33 % (ref 44–72)
NITRITE UR QL STRIP.AUTO: NEGATIVE
NRBC # BLD AUTO: 0 K/UL
NRBC BLD-RTO: 0 /100 WBC
PH UR STRIP.AUTO: 6 [PH] (ref 5–8)
PLATELET # BLD AUTO: 219 K/UL (ref 164–446)
PMV BLD AUTO: 9 FL (ref 9–12.9)
POTASSIUM SERPL-SCNC: 3.5 MMOL/L (ref 3.6–5.5)
PROT SERPL-MCNC: 7.7 G/DL (ref 6–8.2)
PROT UR QL STRIP: NEGATIVE MG/DL
RBC # BLD AUTO: 5.51 M/UL (ref 4.7–6.1)
RBC UR QL AUTO: NEGATIVE
SODIUM SERPL-SCNC: 134 MMOL/L (ref 135–145)
SP GR UR STRIP.AUTO: <=1.005
TREPONEMA PALLIDUM IGG+IGM AB [PRESENCE] IN SERUM OR PLASMA BY IMMUNOASSAY: NORMAL
TRIGL SERPL-MCNC: 220 MG/DL (ref 0–149)
TROPONIN T SERPL-MCNC: <6 NG/L (ref 6–19)
TROPONIN T SERPL-MCNC: <6 NG/L (ref 6–19)
WBC # BLD AUTO: 6.2 K/UL (ref 4.8–10.8)

## 2021-09-25 PROCEDURE — 36415 COLL VENOUS BLD VENIPUNCTURE: CPT

## 2021-09-25 PROCEDURE — 93306 TTE W/DOPPLER COMPLETE: CPT | Mod: 26 | Performed by: INTERNAL MEDICINE

## 2021-09-25 PROCEDURE — 87491 CHLMYD TRACH DNA AMP PROBE: CPT

## 2021-09-25 PROCEDURE — 96372 THER/PROPH/DIAG INJ SC/IM: CPT

## 2021-09-25 PROCEDURE — 83036 HEMOGLOBIN GLYCOSYLATED A1C: CPT

## 2021-09-25 PROCEDURE — 87389 HIV-1 AG W/HIV-1&-2 AB AG IA: CPT

## 2021-09-25 PROCEDURE — G0378 HOSPITAL OBSERVATION PER HR: HCPCS

## 2021-09-25 PROCEDURE — 99220 PR INITIAL OBSERVATION CARE,LEVL III: CPT | Performed by: GENERAL PRACTICE

## 2021-09-25 PROCEDURE — A9270 NON-COVERED ITEM OR SERVICE: HCPCS | Performed by: GENERAL PRACTICE

## 2021-09-25 PROCEDURE — 93306 TTE W/DOPPLER COMPLETE: CPT

## 2021-09-25 PROCEDURE — 83690 ASSAY OF LIPASE: CPT

## 2021-09-25 PROCEDURE — 85025 COMPLETE CBC W/AUTO DIFF WBC: CPT

## 2021-09-25 PROCEDURE — 87591 N.GONORRHOEAE DNA AMP PROB: CPT

## 2021-09-25 PROCEDURE — 71045 X-RAY EXAM CHEST 1 VIEW: CPT

## 2021-09-25 PROCEDURE — 80061 LIPID PANEL: CPT

## 2021-09-25 PROCEDURE — 93005 ELECTROCARDIOGRAM TRACING: CPT

## 2021-09-25 PROCEDURE — 80053 COMPREHEN METABOLIC PANEL: CPT

## 2021-09-25 PROCEDURE — 700111 HCHG RX REV CODE 636 W/ 250 OVERRIDE (IP): Performed by: GENERAL PRACTICE

## 2021-09-25 PROCEDURE — 700102 HCHG RX REV CODE 250 W/ 637 OVERRIDE(OP): Performed by: GENERAL PRACTICE

## 2021-09-25 PROCEDURE — 94760 N-INVAS EAR/PLS OXIMETRY 1: CPT

## 2021-09-25 PROCEDURE — 99285 EMERGENCY DEPT VISIT HI MDM: CPT

## 2021-09-25 PROCEDURE — 84484 ASSAY OF TROPONIN QUANT: CPT | Mod: 91

## 2021-09-25 PROCEDURE — 86780 TREPONEMA PALLIDUM: CPT

## 2021-09-25 PROCEDURE — 93005 ELECTROCARDIOGRAM TRACING: CPT | Performed by: EMERGENCY MEDICINE

## 2021-09-25 PROCEDURE — 81003 URINALYSIS AUTO W/O SCOPE: CPT

## 2021-09-25 RX ORDER — BISACODYL 10 MG
10 SUPPOSITORY, RECTAL RECTAL
Status: DISCONTINUED | OUTPATIENT
Start: 2021-09-25 | End: 2021-09-25

## 2021-09-25 RX ORDER — LABETALOL HYDROCHLORIDE 5 MG/ML
10 INJECTION, SOLUTION INTRAVENOUS EVERY 4 HOURS PRN
Status: DISCONTINUED | OUTPATIENT
Start: 2021-09-25 | End: 2021-09-26 | Stop reason: HOSPADM

## 2021-09-25 RX ORDER — POTASSIUM CHLORIDE 20 MEQ/1
40 TABLET, EXTENDED RELEASE ORAL ONCE
Status: COMPLETED | OUTPATIENT
Start: 2021-09-25 | End: 2021-09-25

## 2021-09-25 RX ORDER — HEPARIN SODIUM 5000 [USP'U]/ML
5000 INJECTION, SOLUTION INTRAVENOUS; SUBCUTANEOUS EVERY 8 HOURS
Status: DISCONTINUED | OUTPATIENT
Start: 2021-09-25 | End: 2021-09-26 | Stop reason: HOSPADM

## 2021-09-25 RX ORDER — LISINOPRIL 5 MG/1
10 TABLET ORAL DAILY
Status: DISCONTINUED | OUTPATIENT
Start: 2021-09-25 | End: 2021-09-26 | Stop reason: HOSPADM

## 2021-09-25 RX ORDER — PROCHLORPERAZINE EDISYLATE 5 MG/ML
5-10 INJECTION INTRAMUSCULAR; INTRAVENOUS EVERY 4 HOURS PRN
Status: DISCONTINUED | OUTPATIENT
Start: 2021-09-25 | End: 2021-09-26 | Stop reason: HOSPADM

## 2021-09-25 RX ORDER — BISACODYL 10 MG
10 SUPPOSITORY, RECTAL RECTAL
Status: DISCONTINUED | OUTPATIENT
Start: 2021-09-25 | End: 2021-09-26 | Stop reason: HOSPADM

## 2021-09-25 RX ORDER — CARVEDILOL 6.25 MG/1
6.25 TABLET ORAL 2 TIMES DAILY WITH MEALS
Status: DISCONTINUED | OUTPATIENT
Start: 2021-09-25 | End: 2021-09-26 | Stop reason: HOSPADM

## 2021-09-25 RX ORDER — ACETAMINOPHEN 325 MG/1
650 TABLET ORAL EVERY 6 HOURS PRN
Status: DISCONTINUED | OUTPATIENT
Start: 2021-09-25 | End: 2021-09-26 | Stop reason: HOSPADM

## 2021-09-25 RX ORDER — ONDANSETRON 2 MG/ML
4 INJECTION INTRAMUSCULAR; INTRAVENOUS EVERY 4 HOURS PRN
Status: DISCONTINUED | OUTPATIENT
Start: 2021-09-25 | End: 2021-09-26 | Stop reason: HOSPADM

## 2021-09-25 RX ORDER — NITROGLYCERIN 400 UG/1
1 SPRAY ORAL PRN
Status: DISCONTINUED | OUTPATIENT
Start: 2021-09-25 | End: 2021-09-25

## 2021-09-25 RX ORDER — HEPARIN SODIUM 5000 [USP'U]/ML
5000 INJECTION, SOLUTION INTRAVENOUS; SUBCUTANEOUS EVERY 8 HOURS
Status: DISCONTINUED | OUTPATIENT
Start: 2021-09-25 | End: 2021-09-25

## 2021-09-25 RX ORDER — NITROGLYCERIN 0.4 MG/1
0.4 TABLET SUBLINGUAL ONCE
Status: DISCONTINUED | OUTPATIENT
Start: 2021-09-25 | End: 2021-09-25

## 2021-09-25 RX ORDER — TAMSULOSIN HYDROCHLORIDE 0.4 MG/1
0.4 CAPSULE ORAL
Status: DISCONTINUED | OUTPATIENT
Start: 2021-09-25 | End: 2021-09-26 | Stop reason: HOSPADM

## 2021-09-25 RX ORDER — POLYETHYLENE GLYCOL 3350 17 G/17G
1 POWDER, FOR SOLUTION ORAL
Status: DISCONTINUED | OUTPATIENT
Start: 2021-09-25 | End: 2021-09-26 | Stop reason: HOSPADM

## 2021-09-25 RX ORDER — ONDANSETRON 4 MG/1
4 TABLET, ORALLY DISINTEGRATING ORAL EVERY 4 HOURS PRN
Status: DISCONTINUED | OUTPATIENT
Start: 2021-09-25 | End: 2021-09-26 | Stop reason: HOSPADM

## 2021-09-25 RX ORDER — PROMETHAZINE HYDROCHLORIDE 25 MG/1
12.5-25 TABLET ORAL EVERY 4 HOURS PRN
Status: DISCONTINUED | OUTPATIENT
Start: 2021-09-25 | End: 2021-09-26 | Stop reason: HOSPADM

## 2021-09-25 RX ORDER — AMOXICILLIN 250 MG
2 CAPSULE ORAL 2 TIMES DAILY
Status: DISCONTINUED | OUTPATIENT
Start: 2021-09-25 | End: 2021-09-25

## 2021-09-25 RX ORDER — POLYETHYLENE GLYCOL 3350 17 G/17G
1 POWDER, FOR SOLUTION ORAL
Status: DISCONTINUED | OUTPATIENT
Start: 2021-09-25 | End: 2021-09-25

## 2021-09-25 RX ORDER — PROMETHAZINE HYDROCHLORIDE 25 MG/1
12.5-25 SUPPOSITORY RECTAL EVERY 4 HOURS PRN
Status: DISCONTINUED | OUTPATIENT
Start: 2021-09-25 | End: 2021-09-26 | Stop reason: HOSPADM

## 2021-09-25 RX ORDER — AMOXICILLIN 250 MG
2 CAPSULE ORAL 2 TIMES DAILY PRN
Status: DISCONTINUED | OUTPATIENT
Start: 2021-09-25 | End: 2021-09-26 | Stop reason: HOSPADM

## 2021-09-25 RX ORDER — ATORVASTATIN CALCIUM 40 MG/1
80 TABLET, FILM COATED ORAL EVERY EVENING
Status: DISCONTINUED | OUTPATIENT
Start: 2021-09-25 | End: 2021-09-26 | Stop reason: HOSPADM

## 2021-09-25 RX ORDER — CLOPIDOGREL BISULFATE 75 MG/1
75 TABLET ORAL DAILY
Status: DISCONTINUED | OUTPATIENT
Start: 2021-09-25 | End: 2021-09-26 | Stop reason: HOSPADM

## 2021-09-25 RX ADMIN — HEPARIN SODIUM 5000 UNITS: 5000 INJECTION, SOLUTION INTRAVENOUS; SUBCUTANEOUS at 09:27

## 2021-09-25 RX ADMIN — CLOPIDOGREL BISULFATE 75 MG: 75 TABLET ORAL at 09:24

## 2021-09-25 RX ADMIN — POTASSIUM CHLORIDE 40 MEQ: 1500 TABLET, EXTENDED RELEASE ORAL at 09:26

## 2021-09-25 RX ADMIN — CARVEDILOL 6.25 MG: 6.25 TABLET, FILM COATED ORAL at 17:44

## 2021-09-25 RX ADMIN — LISINOPRIL 10 MG: 5 TABLET ORAL at 09:25

## 2021-09-25 RX ADMIN — TAMSULOSIN HYDROCHLORIDE 0.4 MG: 0.4 CAPSULE ORAL at 09:30

## 2021-09-25 RX ADMIN — ASPIRIN 81 MG: 81 TABLET, COATED ORAL at 09:21

## 2021-09-25 RX ADMIN — ATORVASTATIN CALCIUM 80 MG: 40 TABLET, FILM COATED ORAL at 17:43

## 2021-09-25 RX ADMIN — CARVEDILOL 6.25 MG: 6.25 TABLET, FILM COATED ORAL at 09:21

## 2021-09-25 ASSESSMENT — COGNITIVE AND FUNCTIONAL STATUS - GENERAL
SUGGESTED CMS G CODE MODIFIER MOBILITY: CH
MOBILITY SCORE: 24
DAILY ACTIVITIY SCORE: 24
SUGGESTED CMS G CODE MODIFIER DAILY ACTIVITY: CH

## 2021-09-25 ASSESSMENT — LIFESTYLE VARIABLES
CONSUMPTION TOTAL: NEGATIVE
ON A TYPICAL DAY WHEN YOU DRINK ALCOHOL HOW MANY DRINKS DO YOU HAVE: 2
HAVE PEOPLE ANNOYED YOU BY CRITICIZING YOUR DRINKING: NO
EVER FELT BAD OR GUILTY ABOUT YOUR DRINKING: NO
EVER HAD A DRINK FIRST THING IN THE MORNING TO STEADY YOUR NERVES TO GET RID OF A HANGOVER: NO
AVERAGE NUMBER OF DAYS PER WEEK YOU HAVE A DRINK CONTAINING ALCOHOL: 2
TOTAL SCORE: 0
HOW MANY TIMES IN THE PAST YEAR HAVE YOU HAD 5 OR MORE DRINKS IN A DAY: 0
HAVE YOU EVER FELT YOU SHOULD CUT DOWN ON YOUR DRINKING: NO
ALCOHOL_USE: NO
TOTAL SCORE: 0
TOTAL SCORE: 0

## 2021-09-25 ASSESSMENT — PAIN DESCRIPTION - PAIN TYPE
TYPE: ACUTE PAIN

## 2021-09-25 ASSESSMENT — PATIENT HEALTH QUESTIONNAIRE - PHQ9
2. FEELING DOWN, DEPRESSED, IRRITABLE, OR HOPELESS: NOT AT ALL
1. LITTLE INTEREST OR PLEASURE IN DOING THINGS: NOT AT ALL
SUM OF ALL RESPONSES TO PHQ9 QUESTIONS 1 AND 2: 0

## 2021-09-25 ASSESSMENT — FIBROSIS 4 INDEX: FIB4 SCORE: 2.045792720962428574

## 2021-09-25 NOTE — ASSESSMENT & PLAN NOTE
Patient was admitted January 2016 for STEMI, patient had a cardiac catheterization with unsuccessful angioplasty and thrombectomy of RCA, manage medically.

## 2021-09-25 NOTE — ED NOTES
VO from Dr Zhang to call about stress test.  Per [facility], pt's stress test scheduled for tomorrow morning and doctor is aware.

## 2021-09-25 NOTE — ASSESSMENT & PLAN NOTE
Patient was admitted January 2016 for STEMI, patient had a cardiac catheterization with unsuccessful angioplasty and thrombectomy of RCA, manage medically.  Unfortunately patient had poor follow-up due to lack of insurance.  CARMINE score of 2, heart score of 6    Echocardiogram ordered.  Stress test ordered.  A1c and lipid panel ordered.    Patient resumed on aspirin, Plavix, beta-blocker, ACE and statin therapy

## 2021-09-25 NOTE — ED NOTES
Pt dozing; even chest rise & fall noted; side rails up x2, call light w/in reach; cardiac & VS monitoring continuing

## 2021-09-25 NOTE — ED NOTES
PIV established, blood tubes collected & delivered to lab.    EKG completed & handed to MD Mendez.

## 2021-09-25 NOTE — ED NOTES
Med Rec completed per patient and recent med rec that was just done   Allergies reviewed  No ORAL antibiotics in last 30 days

## 2021-09-25 NOTE — H&P
Hospital Medicine History & Physical Note    Date of Service  9/25/2021    Primary Care Physician  ANDRÉS Velasquez    Consultants  None    Specialist Names: N/A    Code Status  Full Code    Chief Complaint  Chief Complaint   Patient presents with   • Chest Pain   • Shortness of Breath     Patient states he came home from [facility] last night around 01:00 and had sudden onset of chest pain and SOB.        History of Presenting Illness  Haily Gonzalez is a 56 y.o. male with PMHx of hypertension, hyperlipidemia, CAD,  hx MI 2016 (RCA with thrombus s/p thrombectomy unable to place stent), DDD, and hx of COVID 9/2020, who presented 9/25/2021 with sharp left-sided chest pain that started 1 AM on day of discharge, associated with radiation to left jaw.    Patient was admitted January 2016 for STEMI, patient had a cardiac catheterization with unsuccessful angioplasty and thrombectomy of RCA, manage medically.    Troponin negative, EKG noted first-degree AV block, no ST elevations or depressions.  Of note patient had a stress test performed a year ago which showed mid inferior wall infarct/ischemia, this was managed medically.    ECHO and stress test ordered.    I discussed the plan of care with patient and bedside RN.    Review of Systems  Review of Systems   Genitourinary: Positive for dysuria.   All other systems reviewed and are negative.      Past Medical History   has a past medical history of CAD (coronary artery disease), DDD (degenerative disc disease), lumbar (6/10/2009), Hypertension, and Myocardial infarct (HCC).    Surgical History   has a past surgical history that includes zzz cardiac cath (1/31/16).     Family History  family history includes Diabetes in his mother; Heart Attack (age of onset: 70) in his father; Hypertension in his father.   Family history reviewed with patient. There is family history that is pertinent to the chief complaint.     Social History   reports that he quit smoking about  5 years ago. He quit after 10.00 years of use. He has never used smokeless tobacco. He reports current alcohol use of about 1.2 oz of alcohol per week. He reports that he does not use drugs.    Allergies  No Known Allergies    Medications  Prior to Admission Medications   Prescriptions Last Dose Informant Patient Reported? Taking?   aspirin EC (ECOTRIN) 81 MG Tablet Delayed Response 9/24/2021 at PM Patient Yes No   Sig: Take 81 mg by mouth every day.   atorvastatin (LIPITOR) 80 MG tablet 9/24/2021 at PM Patient Yes No   Sig: Take 80 mg by mouth every evening.   carvedilol (COREG) 6.25 MG Tab 9/24/2021 at PM Patient Yes No   Sig: Take 6.25 mg by mouth 2 times a day, with meals.   clopidogrel (PLAVIX) 75 MG Tab 9/24/2021 at 1500 Patient Yes No   Sig: Take 75 mg by mouth every day.   lisinopril (PRINIVIL) 10 MG Tab 9/24/2021 at 1500 Patient Yes No   Sig: Take 10 mg by mouth every day.   nitroglycerin (NITROLINGUAL) 0.4 MG/SPRAY Solution PRN at PRN Patient No No   Sig: Place 1 Spray under tongue as needed.      Facility-Administered Medications: None       Physical Exam  Temp:  [36.2 °C (97.1 °F)-36.9 °C (98.4 °F)] 36.9 °C (98.4 °F)  Pulse:  [55-66] 55  Resp:  [14-17] 14  BP: (123-168)/() 143/96  SpO2:  [98 %-99 %] 98 %  Blood Pressure: 133/86   Temperature: 36.2 °C (97.1 °F)   Pulse: 60   Respiration: 17   Pulse Oximetry: 99 %       Physical Exam  Vitals and nursing note reviewed.   Constitutional:       General: He is not in acute distress.     Appearance: Normal appearance.   HENT:      Head: Normocephalic and atraumatic.   Eyes:      Extraocular Movements: Extraocular movements intact.      Conjunctiva/sclera: Conjunctivae normal.      Pupils: Pupils are equal, round, and reactive to light.   Cardiovascular:      Rate and Rhythm: Normal rate and regular rhythm.      Pulses: Normal pulses.      Heart sounds: No murmur heard.   No friction rub. No gallop.    Pulmonary:      Effort: Pulmonary effort is normal. No  respiratory distress.      Breath sounds: Normal breath sounds. No wheezing, rhonchi or rales.   Abdominal:      General: Bowel sounds are normal. There is no distension.      Palpations: Abdomen is soft.      Tenderness: There is no abdominal tenderness.   Musculoskeletal:         General: No swelling or tenderness. Normal range of motion.      Cervical back: Normal range of motion and neck supple. No muscular tenderness.      Right lower leg: No edema.      Left lower leg: No edema.   Skin:     General: Skin is warm and dry.      Capillary Refill: Capillary refill takes less than 2 seconds.      Findings: No bruising, erythema or rash.   Neurological:      General: No focal deficit present.      Mental Status: He is alert and oriented to person, place, and time.         Laboratory:  Recent Labs     09/25/21  0552   WBC 6.2   RBC 5.51   HEMOGLOBIN 16.5   HEMATOCRIT 48.7   MCV 88.4   MCH 29.9   MCHC 33.9   RDW 44.3   PLATELETCT 219   MPV 9.0     Recent Labs     09/25/21  0552   SODIUM 134*   POTASSIUM 3.5*   CHLORIDE 97   CO2 25   GLUCOSE 93   BUN 11   CREATININE 0.96   CALCIUM 9.1     Recent Labs     09/25/21  0552   ALTSGPT 22   ASTSGOT 20   ALKPHOSPHAT 68   TBILIRUBIN 0.9   LIPASE 38   GLUCOSE 93         No results for input(s): NTPROBNP in the last 72 hours.  Recent Labs     09/25/21  0552   TRIGLYCERIDE 220*   HDL 46   LDL 65     Recent Labs     09/25/21  0552   TROPONINT <6       Imaging:  DX-CHEST-PORTABLE (1 VIEW)   Final Result      No consolidation.      EC-ECHOCARDIOGRAM COMPLETE W/O CONT    (Results Pending)   NM-CARDIAC STRESS TEST    (Results Pending)       X-Ray:  I have personally reviewed the images and compared with prior images.  EKG:  I have personally reviewed the images and compared with prior images.    Assessment/Plan:  I anticipate this patient is appropriate for observation status at this time.    * Other chest pain  Assessment & Plan  Patient was admitted January 2016 for STEMI, patient  had a cardiac catheterization with unsuccessful angioplasty and thrombectomy of RCA, manage medically.  Unfortunately patient had poor follow-up due to lack of insurance.  CARMINE score of 2, heart score of 6    Echocardiogram ordered.  Stress test ordered.  A1c and lipid panel ordered.    Patient resumed on aspirin, Plavix, beta-blocker, ACE and statin therapy    Coronary artery disease due to lipid rich plaque- (present on admission)  Assessment & Plan  Patient was admitted January 2016 for STEMI, patient had a cardiac catheterization with unsuccessful angioplasty and thrombectomy of RCA, manage medically.    HTN (hypertension)- (present on admission)  Assessment & Plan  Resumed lisinopril and Coreg    Hyperlipemia- (present on admission)  Assessment & Plan  Resume statin therapy    Hypokalemia- (present on admission)  Assessment & Plan  Mild  Monitor magnesium and potassium  Replenish and continue to monitor      VTE prophylaxis: SCDs/TEDs and heparin ppx

## 2021-09-25 NOTE — ED NOTES
Pt A&Ox4, resp even & unlabored, speech clear, skin WNL.  Pt denies chest pain; reports minimal chest pressure. Spouse in room.  Cardiac monitoring in progress.  IV 18G RAC.  Last food intake: 1800 yesterday.  Pt questioning NTG medication.  Reports he has NTG at home, but hasn't taken it

## 2021-09-25 NOTE — ED PROVIDER NOTES
ED Provider Note    CHIEF COMPLAINT  Chief Complaint   Patient presents with   • Chest Pain   • Shortness of Breath     Patient states he came home from [facility] last night around 01:00 and had sudden onset of chest pain and SOB.        HPI  Hialy Gonzalez is a 56 y.o. male who presents to the emergency department the chief complaint of chest pain.  Patient says that the pain started last night about 1:00 in the morning.  He localizes to the left side of his chest.  He says that it radiated to the left side of his jaw.  Is fairly severe when it started but now it is substantially improved.  No real alleviating or exacerbating factors.  No other associated symptoms.  No diaphoresis, cough, chills, nausea, or vomiting.  No leg pain or swelling.  The patient has a history of coronary artery disease.  He has a history of prior myocardial infarction and stent placement.  He says that he has been compliant with his medications.    REVIEW OF SYSTEMS  See HPI for further details. All other systems are negative.     PAST MEDICAL HISTORY  Past Medical History:   Diagnosis Date   • CAD (coronary artery disease)    • DDD (degenerative disc disease), lumbar 6/10/2009       FAMILY HISTORY  Family History   Problem Relation Age of Onset   • Diabetes Mother    • Hypertension Father    • Heart Attack Father 70        CAd       SOCIAL HISTORY  Social History     Socioeconomic History   • Marital status: Single     Spouse name: Not on file   • Number of children: Not on file   • Years of education: Not on file   • Highest education level: Not on file   Occupational History   • Not on file   Tobacco Use   • Smoking status: Former Smoker     Years: 10.00     Quit date: 2016     Years since quittin.6   • Smokeless tobacco: Never Used   Vaping Use   • Vaping Use: Never used   Substance and Sexual Activity   • Alcohol use: Yes     Alcohol/week: 1.2 oz     Types: 2 Cans of beer per week   • Drug use: No   • Sexual activity:  Yes   Other Topics Concern   • Not on file   Social History Narrative   • Not on file     Social Determinants of Health     Financial Resource Strain:    • Difficulty of Paying Living Expenses:    Food Insecurity:    • Worried About Running Out of Food in the Last Year:    • Ran Out of Food in the Last Year:    Transportation Needs:    • Lack of Transportation (Medical):    • Lack of Transportation (Non-Medical):    Physical Activity:    • Days of Exercise per Week:    • Minutes of Exercise per Session:    Stress:    • Feeling of Stress :    Social Connections:    • Frequency of Communication with Friends and Family:    • Frequency of Social Gatherings with Friends and Family:    • Attends [employer] Services:    • Active Member of Clubs or Organizations:    • Attends Club or Organization Meetings:    • Marital Status:    Intimate Partner Violence:    • Fear of Current or Ex-Partner:    • Emotionally Abused:    • Physically Abused:    • Sexually Abused:        SURGICAL HISTORY  Past Surgical History:   Procedure Laterality Date   • ZZZ CARDIAC CATH  1/31/16    unable to open RCA with Thombus       CURRENT MEDICATIONS  Home Medications     Reviewed by Gail Richardson R.N. (Registered Nurse) on 09/25/21 at 0615  Med List Status: Complete   Medication Last Dose Status   aspirin EC (ECOTRIN) 81 MG Tablet Delayed Response 9/24/2021 Active   atorvastatin (LIPITOR) 80 MG tablet 9/24/2021 Active   carvedilol (COREG) 6.25 MG Tab 9/24/2021 Active   clopidogrel (PLAVIX) 75 MG Tab 9/24/2021 Active   lisinopril (PRINIVIL) 10 MG Tab 9/24/2021 Active   nitroglycerin (NITROLINGUAL) 0.4 MG/SPRAY Solution  Active                ALLERGIES  No Known Allergies    PHYSICAL EXAM  VITAL SIGNS: BP (!) 168/117   Pulse (!) 58   Temp 36.2 °C (97.1 °F) (Temporal)   Resp 16   Wt 81.6 kg (180 lb)   SpO2 98%   BMI 28.19 kg/m²    Constitutional: Well developed, Well nourished, No acute distress, Non-toxic appearance.   HENT: Normocephalic,  Atraumatic, Bilateral external ears normal, Oropharynx moist, No oral exudates, Nose normal.   Eyes: PERRLA, EOMI, Conjunctiva normal, No discharge.   Neck: Normal range of motion, No tenderness, Supple, No stridor.   Cardiovascular: Regular rate and rhythm, no audible murmur  Thorax & Lungs: There to auscultation bilaterally.  No rales, rhonchi, or wheezing.  Abdomen: Bowel sounds normal, Soft, No tenderness, No masses, No pulsatile masses.   Skin: Warm, Dry, No erythema, No rash.   Back: No tenderness, No CVA tenderness.   Extremities: Intact distal pulses, No tenderness, No cyanosis, No clubbing.  No edema.  No calf tenderness.  Neurologic: Alert & oriented x 3, Normal motor function, Normal sensory function, No focal deficits noted.     EKG  Twelve-lead EKG interpreted by me.  See below.    RADIOLOGY/PROCEDURES  DX-CHEST-PORTABLE (1 VIEW)   Final Result      No consolidation.        Results for orders placed or performed during the hospital encounter of 09/25/21   CBC WITH DIFFERENTIAL   Result Value Ref Range    WBC 6.2 4.8 - 10.8 K/uL    RBC 5.51 4.70 - 6.10 M/uL    Hemoglobin 16.5 14.0 - 18.0 g/dL    Hematocrit 48.7 42.0 - 52.0 %    MCV 88.4 81.4 - 97.8 fL    MCH 29.9 27.0 - 33.0 pg    MCHC 33.9 33.7 - 35.3 g/dL    RDW 44.3 35.9 - 50.0 fL    Platelet Count 219 164 - 446 K/uL    MPV 9.0 9.0 - 12.9 fL    Neutrophils-Polys 33.00 (L) 44.00 - 72.00 %    Lymphocytes 54.60 (H) 22.00 - 41.00 %    Monocytes 9.60 0.00 - 13.40 %    Eosinophils 2.10 0.00 - 6.90 %    Basophils 0.50 0.00 - 1.80 %    Immature Granulocytes 0.20 0.00 - 0.90 %    Nucleated RBC 0.00 /100 WBC    Neutrophils (Absolute) 2.04 1.82 - 7.42 K/uL    Lymphs (Absolute) 3.37 1.00 - 4.80 K/uL    Monos (Absolute) 0.59 0.00 - 0.85 K/uL    Eos (Absolute) 0.13 0.00 - 0.51 K/uL    Baso (Absolute) 0.03 0.00 - 0.12 K/uL    Immature Granulocytes (abs) 0.01 0.00 - 0.11 K/uL    NRBC (Absolute) 0.00 K/uL   COMP METABOLIC PANEL   Result Value Ref Range    Sodium 134  (L) 135 - 145 mmol/L    Potassium 3.5 (L) 3.6 - 5.5 mmol/L    Chloride 97 96 - 112 mmol/L    Co2 25 20 - 33 mmol/L    Anion Gap 12.0 7.0 - 16.0    Glucose 93 65 - 99 mg/dL    Bun 11 8 - 22 mg/dL    Creatinine 0.96 0.50 - 1.40 mg/dL    Calcium 9.1 8.4 - 10.2 mg/dL    AST(SGOT) 20 12 - 45 U/L    ALT(SGPT) 22 2 - 50 U/L    Alkaline Phosphatase 68 30 - 99 U/L    Total Bilirubin 0.9 0.1 - 1.5 mg/dL    Albumin 4.8 3.2 - 4.9 g/dL    Total Protein 7.7 6.0 - 8.2 g/dL    Globulin 2.9 1.9 - 3.5 g/dL    A-G Ratio 1.7 g/dL   LIPASE   Result Value Ref Range    Lipase 38 7 - 58 U/L   TROPONIN   Result Value Ref Range    Troponin T <6 6 - 19 ng/L   ESTIMATED GFR   Result Value Ref Range    GFR If African American >60 >60 mL/min/1.73 m 2    GFR If Non African American >60 >60 mL/min/1.73 m 2   EKG   Result Value Ref Range    Report       [facility] Emergency Dept.    Test Date:  2021  Pt Name:    EILEEN EASTON      Department: [facility]  MRN:        [id number]                      [facility]  Gender:     Male                         Technician: RN  :        [id number]                   Requested By:ER TRIAGE PROTOCOL  Order #:    [id number]                    Reading MD: SAW SELLERS MD    Measurements  Intervals                                Axis  Rate:       58                           P:          77  WA:         220                          QRS:        -4  QRSD:       98                           T:          16  QT:         432  QTc:        425    Interpretive Statements  SINUS BRADYCARDIA  FIRST DEGREE AV BLOCK  INFERIOR INFARCT, AGE INDETERMINATE  Compared to ECG 2020 02:33:54  First degree AV block now present  Sinus rhythm no longer present  Myocardial infarct finding still present  Electronically Signed On 2021 6:52:20 PDT by SAW SELLERS MD           COURSE & MEDICAL DECISION MAKING  Pertinent Labs & Imaging studies reviewed. (See chart for  details)    Patient presents today with chest pain.  Pain is improving however the patient has substantial risk factors for acute coronary syndrome.  IV is established.  Laboratory studies were obtained.  EKG is remarkable for nonspecific changes.    7:38 AM troponin is not elevated.  I have reviewed the electronic medical record.  The patient had an abnormal stress test 1 year ago which showed some francine-infarct ischemia.  This was medically managed at the time.  2-hour troponin will be obtained.  Will consult the on-call hospitalist for hospitalization.    I spoke with the on-call hospitalist to have the patient for hospitalization.      FINAL IMPRESSION  1. Acute chest pain    2. Coronary artery disease involving native coronary artery of native heart with angina pectoris (HCC)              Electronically signed by: Jeffrey Mendez M.D., 9/25/2021 6:50 AM

## 2021-09-25 NOTE — ED NOTES
Resting quietly on gurney.  Urinal emptied [id number] clear urine.  No additional needs, at this time.

## 2021-09-26 ENCOUNTER — APPOINTMENT (OUTPATIENT)
Dept: RADIOLOGY | Facility: MEDICAL CENTER | Age: 56
End: 2021-09-26
Attending: GENERAL PRACTICE

## 2021-09-26 VITALS
SYSTOLIC BLOOD PRESSURE: 115 MMHG | BODY MASS INDEX: 28.19 KG/M2 | TEMPERATURE: 98.1 F | DIASTOLIC BLOOD PRESSURE: 68 MMHG | OXYGEN SATURATION: 96 % | HEART RATE: 58 BPM | RESPIRATION RATE: 18 BRPM | WEIGHT: 180 LBS

## 2021-09-26 LAB
ANION GAP SERPL CALC-SCNC: 12 MMOL/L (ref 7–16)
BUN SERPL-MCNC: 14 MG/DL (ref 8–22)
C TRACH DNA SPEC QL NAA+PROBE: NEGATIVE
CALCIUM SERPL-MCNC: 8.7 MG/DL (ref 8.4–10.2)
CHLORIDE SERPL-SCNC: 99 MMOL/L (ref 96–112)
CO2 SERPL-SCNC: 24 MMOL/L (ref 20–33)
CREAT SERPL-MCNC: 0.93 MG/DL (ref 0.5–1.4)
GLUCOSE SERPL-MCNC: 97 MG/DL (ref 65–99)
MAGNESIUM SERPL-MCNC: 1.7 MG/DL (ref 1.5–2.5)
N GONORRHOEA DNA SPEC QL NAA+PROBE: NEGATIVE
POTASSIUM SERPL-SCNC: 3.8 MMOL/L (ref 3.6–5.5)
SODIUM SERPL-SCNC: 135 MMOL/L (ref 135–145)
SPECIMEN SOURCE: NORMAL
TROPONIN T SERPL-MCNC: 7 NG/L (ref 6–19)

## 2021-09-26 PROCEDURE — 80048 BASIC METABOLIC PNL TOTAL CA: CPT

## 2021-09-26 PROCEDURE — 78452 HT MUSCLE IMAGE SPECT MULT: CPT | Mod: 26 | Performed by: INTERNAL MEDICINE

## 2021-09-26 PROCEDURE — G0378 HOSPITAL OBSERVATION PER HR: HCPCS

## 2021-09-26 PROCEDURE — 700111 HCHG RX REV CODE 636 W/ 250 OVERRIDE (IP)

## 2021-09-26 PROCEDURE — 96372 THER/PROPH/DIAG INJ SC/IM: CPT

## 2021-09-26 PROCEDURE — 84484 ASSAY OF TROPONIN QUANT: CPT

## 2021-09-26 PROCEDURE — A9270 NON-COVERED ITEM OR SERVICE: HCPCS | Performed by: GENERAL PRACTICE

## 2021-09-26 PROCEDURE — A9502 TC99M TETROFOSMIN: HCPCS

## 2021-09-26 PROCEDURE — 90686 IIV4 VACC NO PRSV 0.5 ML IM: CPT | Performed by: GENERAL PRACTICE

## 2021-09-26 PROCEDURE — 700111 HCHG RX REV CODE 636 W/ 250 OVERRIDE (IP): Performed by: GENERAL PRACTICE

## 2021-09-26 PROCEDURE — 83735 ASSAY OF MAGNESIUM: CPT

## 2021-09-26 PROCEDURE — 700102 HCHG RX REV CODE 250 W/ 637 OVERRIDE(OP): Performed by: GENERAL PRACTICE

## 2021-09-26 PROCEDURE — 90471 IMMUNIZATION ADMIN: CPT

## 2021-09-26 PROCEDURE — 99217 PR OBSERVATION CARE DISCHARGE: CPT | Performed by: GENERAL PRACTICE

## 2021-09-26 PROCEDURE — 93018 CV STRESS TEST I&R ONLY: CPT | Performed by: INTERNAL MEDICINE

## 2021-09-26 RX ORDER — CLOPIDOGREL BISULFATE 75 MG/1
75 TABLET ORAL DAILY
Qty: 90 TABLET | Refills: 0 | Status: SHIPPED | OUTPATIENT
Start: 2021-09-26 | End: 2021-12-25

## 2021-09-26 RX ORDER — REGADENOSON 0.08 MG/ML
INJECTION, SOLUTION INTRAVENOUS
Status: COMPLETED
Start: 2021-09-26 | End: 2021-09-26

## 2021-09-26 RX ORDER — TAMSULOSIN HYDROCHLORIDE 0.4 MG/1
0.4 CAPSULE ORAL
Qty: 90 CAPSULE | Refills: 0 | Status: SHIPPED | OUTPATIENT
Start: 2021-09-27 | End: 2021-12-26

## 2021-09-26 RX ORDER — ATORVASTATIN CALCIUM 80 MG/1
80 TABLET, FILM COATED ORAL EVERY EVENING
Qty: 90 TABLET | Refills: 0 | Status: SHIPPED | OUTPATIENT
Start: 2021-09-26 | End: 2021-12-25

## 2021-09-26 RX ORDER — LISINOPRIL 10 MG/1
10 TABLET ORAL DAILY
Qty: 90 TABLET | Refills: 0 | Status: SHIPPED | OUTPATIENT
Start: 2021-09-26 | End: 2021-12-25

## 2021-09-26 RX ORDER — NITROGLYCERIN 400 UG/1
1 SPRAY ORAL PRN
Qty: 4.9 G | Refills: 1 | Status: SHIPPED | OUTPATIENT
Start: 2021-09-26 | End: 2021-10-26

## 2021-09-26 RX ORDER — CARVEDILOL 6.25 MG/1
6.25 TABLET ORAL 2 TIMES DAILY WITH MEALS
Qty: 180 TABLET | Refills: 0 | Status: SHIPPED | OUTPATIENT
Start: 2021-09-26 | End: 2021-12-25

## 2021-09-26 RX ADMIN — TAMSULOSIN HYDROCHLORIDE 0.4 MG: 0.4 CAPSULE ORAL at 07:48

## 2021-09-26 RX ADMIN — ASPIRIN 81 MG: 81 TABLET, COATED ORAL at 05:43

## 2021-09-26 RX ADMIN — HEPARIN SODIUM 5000 UNITS: 5000 INJECTION, SOLUTION INTRAVENOUS; SUBCUTANEOUS at 00:35

## 2021-09-26 RX ADMIN — LISINOPRIL 10 MG: 5 TABLET ORAL at 05:43

## 2021-09-26 RX ADMIN — INFLUENZA A VIRUS A/VICTORIA/2570/2019 IVR-215 (H1N1) ANTIGEN (FORMALDEHYDE INACTIVATED), INFLUENZA A VIRUS A/TASMANIA/503/2020 IVR-221 (H3N2) ANTIGEN (FORMALDEHYDE INACTIVATED), INFLUENZA B VIRUS B/PHUKET/3073/2013 ANTIGEN (FORMALDEHYDE INACTIVATED), AND INFLUENZA B VIRUS B/WASHINGTON/02/2019 ANTIGEN (FORMALDEHYDE INACTIVATED) 0.5 ML: 15; 15; 15; 15 INJECTION, SUSPENSION INTRAMUSCULAR at 12:06

## 2021-09-26 RX ADMIN — CLOPIDOGREL BISULFATE 75 MG: 75 TABLET ORAL at 05:46

## 2021-09-26 RX ADMIN — REGADENOSON 0.4 MG: 0.08 INJECTION, SOLUTION INTRAVENOUS at 09:38

## 2021-09-26 NOTE — CARE PLAN
The patient is Stable - Low risk of patient condition declining or worsening    Shift Goals  Clinical Goals: Monitor chest pain  Patient Goals: rest, pain    Progress made toward(s) clinical / shift goals:  Patient denies chest pain at this time    Patient is not progressing towards the following goals: n/a        Problem: Knowledge Deficit - Standard  Goal: Patient and family/care givers will demonstrate understanding of plan of care, disease process/condition, diagnostic tests and medications  9/25/2021 2305 by JARED AtkinsonN.  Outcome: Progressing  9/25/2021 2304 by Giana Newman R.N.  Outcome: Progressing     Problem: Pain - Standard  Goal: Alleviation of pain or a reduction in pain to the patient’s comfort goal  Outcome: Progressing

## 2021-09-26 NOTE — PROCEDURES
Nursing care plan includes knowledge deficit, potential for discomfort, potential for compromised cardiac output. POC includes teaching, comfort measures and reassurance, and access to code cart, cardiology stand by and availability of rapid response team. Pt verbalizes good understanding of benefits and risks of pharmacological cardiac stress test. Informed consent obtained. Lexiscan given, pt developed SOB. Baseline EKG shows SB with no significant changes or findings throughout exam. Occasional PVC's present. VS stable, symptoms resolved. To waiting room, caffeinated fluids given. Nursing goals met.

## 2021-09-26 NOTE — CARE PLAN
The patient is Stable - Low risk of patient condition declining or worsening    Shift Goals  Clinical Goals: monitor chest pain, stress test  Patient Goals: go home    Progress made toward(s) clinical / shift goals:  patient denies any chest pain at the time of assessment and has received stress test.     Patient is not progressing towards the following goals:    Problem: Knowledge Deficit - Standard  Goal: Patient and family/care givers will demonstrate understanding of plan of care, disease process/condition, diagnostic tests and medications  Outcome: Progressing  Discussed plan of care with patient including stress test and medication administered. Allowed time for questions, patient agreed and verbalized understanding.     Problem: Pain - Standard  Goal: Alleviation of pain or a reduction in pain to the patient’s comfort goal  Outcome: Progressing   Patient denies any chest pain at the time of assessment, educated patient to inform RN of any pain. Patient got stress test.

## 2021-09-26 NOTE — DISCHARGE SUMMARY
Discharge Summary    CHIEF COMPLAINT ON ADMISSION  Chief Complaint   Patient presents with   • Chest Pain   • Shortness of Breath     Patient states he came home from [facility] last night around 01:00 and had sudden onset of chest pain and SOB.        Reason for Admission  Chest pain     Admission Date  9/25/2021    CODE STATUS  Full Code    HPI & HOSPITAL COURSE  Haily Gonzalez is a 56 y.o. male with PMHx of hypertension, hyperlipidemia, CAD,  hx MI 2016 (RCA with thrombus s/p thrombectomy unable to place stent), DDD, and hx of COVID 9/2020, who presented 9/25/2021 with sharp left-sided chest pain that started 1 AM on day of discharge, associated with radiation to left jaw.     Patient was admitted January 2016 for STEMI, patient had a cardiac catheterization with unsuccessful angioplasty and thrombectomy of RCA, manage medically.     Troponin negative, EKG noted first-degree AV block, no ST elevations or depressions.  Of note patient had a stress test performed a year ago which showed mid inferior wall infarct/ischemia, this was managed medically.      ECHO was ordered, normal LVEF of 65%.   Stress test was performed, noted large defect in the inferior and inferior lateral segments both at rest and under stress which is suggestive of scarring, no ischemia.  Spoke with cardiology, no further interventions at this time.  Patient is to continue with medical management and follow-up with cardiology outpatient.    Patient given prescriptions for 90-day supply of medications, given referral for cardiology, and encouraged to establish himself and follow-up with a primary care physician on discharge.    Therefore, he is discharged in good and stable condition to home with close outpatient follow-up.    The patient recovered much more quickly than anticipated on admission.    Discharge Date  09/26/2021    FOLLOW UP ITEMS POST DISCHARGE  Primary care physician  Cardiology    DISCHARGE DIAGNOSES  Principal Problem:     Other chest pain POA: Unknown  Active Problems:    Coronary artery disease due to lipid rich plaque POA: Yes    HTN (hypertension) POA: Yes    Hyperlipemia POA: Yes    Hypokalemia POA: Yes  Resolved Problems:    * No resolved hospital problems. *      FOLLOW UP  ANDRÉS Velasquez  [address]  [place] [id number] [phone]    In 2 weeks      Kristyn Nichols M.D.  [address]  [address]  [place] [address]  [phone]    In 2 weeks        MEDICATIONS ON DISCHARGE     Medication List      START taking these medications      Instructions   tamsulosin 0.4 MG capsule  Start taking on: September 27, 2021  Commonly known as: FLOMAX   Take 1 Capsule by mouth 1/2 hour after breakfast for 90 days.  Dose: 0.4 mg        CHANGE how you take these medications      Instructions   nitroglycerin 0.4 MG/SPRAY Soln  What changed: reasons to take this  Commonly known as: NITROLINGUAL   Place 1 Spray under the tongue as needed (chest pain) for up to 30 days.  Dose: 1 Spray        CONTINUE taking these medications      Instructions   aspirin EC 81 MG Tbec  Commonly known as: ECOTRIN   Take 1 Tablet by mouth every day for 90 days.  Dose: 81 mg     atorvastatin 80 MG tablet  Commonly known as: LIPITOR   Take 1 Tablet by mouth every evening for 90 days.  Dose: 80 mg     carvedilol 6.25 MG Tabs  Commonly known as: COREG   Take 1 Tablet by mouth 2 times a day with meals for 90 days.  Dose: 6.25 mg     clopidogrel 75 MG Tabs  Commonly known as: PLAVIX   Take 1 Tablet by mouth every day for 90 days.  Dose: 75 mg     lisinopril 10 MG Tabs  Commonly known as: PRINIVIL   Take 1 Tablet by mouth every day for 90 days.  Dose: 10 mg            Allergies  No Known Allergies    DIET  Orders Placed This Encounter   Procedures   • Diet Order Diet: Cardiac; Miscellaneous modifications: (optional): No Decaf, No Caffeine(for test)     Standing Status:   Standing     Number of Occurrences:   1     Order Specific Question:   Diet:     Answer:    Cardiac [6]     Order Specific Question:   Miscellaneous modifications: (optional)     Answer:   No Decaf, No Caffeine(for test) [11]       ACTIVITY  As tolerated.  Weight bearing as tolerated    CONSULTATIONS  None    PROCEDURES  None    LABORATORY  Lab Results   Component Value Date    SODIUM 135 09/26/2021    POTASSIUM 3.8 09/26/2021    CHLORIDE 99 09/26/2021    CO2 24 09/26/2021    GLUCOSE 97 09/26/2021    BUN 14 09/26/2021    CREATININE 0.93 09/26/2021    CREATININE 0.9 05/02/2008        Lab Results   Component Value Date    WBC 6.2 09/25/2021    HEMOGLOBIN 16.5 09/25/2021    HEMATOCRIT 48.7 09/25/2021    PLATELETCT 219 09/25/2021      NM-CARDIAC STRESS TEST   Final Result      EC-ECHOCARDIOGRAM COMPLETE W/O CONT   Final Result      DX-CHEST-PORTABLE (1 VIEW)   Final Result      No consolidation.        Total time of the discharge process exceeds 45 minutes.

## 2021-09-26 NOTE — PROGRESS NOTES
Report received from day shift RN. Patient is resting in bed with no needs at this time. Safety measures in place and call light/water within reach

## 2021-09-26 NOTE — DISCHARGE INSTRUCTIONS
Please continue taking all your medications as you were.  I have added 1 medication for your prostate, Flomax 0.4 mg daily take this every morning.  Please follow-up with cardiology within 1 month.  Please establish yourself with a primary care physician and follow-up with them routinely.  Please take care and be well.    --    Continúe tomando todos bon medicamentos lucho antes.  He agregado 1 medicamento para la próstata, Flomax 0.4 mg al día, tome esto todas las mañanas.  Realice un seguimiento con cardiología en el plazo de 1 mes.  Póngase en contacto con un médico de atención primaria y leoncio un seguimiento con él de forma rutinaria.  Por favor, cuídense y estén to.    Discharge Instructions    Discharged to home by car with relative. Discharged via wheelchair, hospital escort: Yes.  Special equipment needed: Not Applicable    Be sure to schedule a follow-up appointment with your primary care doctor or any specialists as instructed.     Discharge Plan:   Diet Plan: Discussed  Activity Level: Discussed  Confirmed Follow up Appointment: Patient to Call and Schedule Appointment  Confirmed Symptoms Management: Discussed  Medication Reconciliation Updated: Yes  Influenza Vaccine Indication: Indicated: 9 to 64 years of age  Influenza Vaccine Given - only chart on this line when given: Influenza Vaccine Given (See MAR)    I understand that a diet low in cholesterol, fat, and sodium is recommended for good health. Unless I have been given specific instructions below for another diet, I accept this instruction as my diet prescription.   Other diet: Regular    Special Instructions: None    · Is patient discharged on Warfarin / Coumadin?   No     Dolor en el pecho, Inespecífico  (Chest Pain, Nonspecific)  Con frecuencia es difícil kerwin un diagnóstico específico de la causa del dolor de pecho. Siempre hay fabio posibilidad de que gamez dolor puede estar relacionado con algo grave, lucho un ataque al corazón o un coágulo de usha  en los pulmones. Deberá hacer controles con gamez médico para fabio evaluación adicional. Puede ser necesario realizar otros análisis de laboratorio u otros estudios tales lucho radiografías, electrocardiograma, prueba de esfuerzo, o diagnóstico por imágenes para el corazón para determinar la causa de gamez dolor.   La mayor parte del dolor en el pecho inespecífico mejorará en 2 ó 3 días de reposo y analgésicos suaves. [place] los próximos días evite los ejercicios físicos o las actividades que le causan dolor. No fume. Evite consumir alcohol. Comuníquese con gamez médico para realizar controles según las indicaciones.   SOLICITE ATENCIÓN MÉDICA DE INMEDIATO SI:  · El dolor en el pecho aumenta o se irradia hacia el brazo, el rosendo, la mandíbula, la espalda o el abdomen.   · Le falta el aire, aumenta la tos o comienza a escupir usha al toser.   · Siente un dolor intenso en la espalda, el abdomen, tiene náuseas o vómitos intensos.   · Desarrolla fabio debilidad extrema, se desmaya, tiene fiebre o escalofríos.   Document Released: 12/18/2006 Document Revised: 03/11/2013  [employer]® Patient Information ©2013 [employer].    Depression / Suicide Risk    As you are discharged from this [facility], it is important to learn how to keep safe from harming yourself.    Recognize the warning signs:  · Abrupt changes in personality, positive or negative- including increase in energy   · Giving away possessions  · Change in eating patterns- significant weight changes-  positive or negative  · Change in sleeping patterns- unable to sleep or sleeping all the time   · Unwillingness or inability to communicate  · Depression  · Unusual sadness, discouragement and loneliness  · Talk of wanting to die  · Neglect of personal appearance   · Rebelliousness- reckless behavior  · Withdrawal from people/activities they love  · Confusion- inability to concentrate     If you or a loved one observes any of these behaviors or has concerns about  self-harm, here's what you can do:  · Talk about it- your feelings and reasons for harming yourself  · Remove any means that you might use to hurt yourself (examples: pills, rope, extension cords, firearm)  · Get professional help from the community (Mental Health, Substance Abuse, psychological counseling)  · Do not be alone:Call your Safe Contact- someone whom you trust who will be there for you.  · Call your local CRISIS HOTLINE 705-1556 or 487-832-7928  · Call your local Children's Mobile Crisis Response Team [place] (409) 011-4298 or www.LessThan3  · Call the toll free National Suicide Prevention Hotlines   · National Suicide Prevention Lifeline 707-579-LJWT (9270)  · National Hope Line Network 800-SUICIDE (003-9519)

## 2021-09-26 NOTE — PROGRESS NOTES
Report received from Davis GALVEZ and Giana GALVEZ. Plan of care discussed. Patient resting comfortably in bed. Safety precautions in place.

## 2021-09-26 NOTE — PROGRESS NOTES
Pt discharged to home. Discharge instructions provided to pt. Pt verbalizes understanding. Pt states all questions have been answered. Signed copy in chart. Prescriptions given to patient to fill at his home pharmacy. Pt states that all personal belongings are in possession. Pt off unit via wheelchair, escorted by transport.

## 2021-09-26 NOTE — PROGRESS NOTES
Telemetry Shift Summary    Rhythm Sinus Rhythm  HR Range 50-60s  Ectopy R PVC  Measurements 0.18/0.08/0.38      Normal Values  Rhythm SR  HR Range:   Measurements: 0.12-0.20/0.06-0.10/0.30-0.52

## 2021-09-27 NOTE — PROGRESS NOTES
Telemetry Shift Summary    Rhythm SR  HR Range 61-72  Ectopy none  Measurements .18/.08/.40        Normal Values  Rhythm SR  HR Range    Measurements 0.12-0.20 / 0.06-0.10  / 0.30-0.52

## 2023-07-24 ENCOUNTER — HOSPITAL ENCOUNTER (OUTPATIENT)
Facility: MEDICAL CENTER | Age: 58
End: 2023-07-25
Attending: EMERGENCY MEDICINE | Admitting: HOSPITALIST

## 2023-07-24 ENCOUNTER — APPOINTMENT (OUTPATIENT)
Dept: RADIOLOGY | Facility: MEDICAL CENTER | Age: 58
End: 2023-07-24
Attending: EMERGENCY MEDICINE

## 2023-07-24 DIAGNOSIS — S43.492A OTHER SPRAIN OF LEFT SHOULDER JOINT, INITIAL ENCOUNTER: ICD-10-CM

## 2023-07-24 DIAGNOSIS — R00.1 BRADYCARDIA: ICD-10-CM

## 2023-07-24 DIAGNOSIS — R07.9 ACUTE CHEST PAIN: ICD-10-CM

## 2023-07-24 DIAGNOSIS — R07.9 CHEST PAIN, UNSPECIFIED TYPE: ICD-10-CM

## 2023-07-24 DIAGNOSIS — M25.519 CHRONIC SHOULDER PAIN, UNSPECIFIED LATERALITY: ICD-10-CM

## 2023-07-24 DIAGNOSIS — G89.29 CHRONIC SHOULDER PAIN, UNSPECIFIED LATERALITY: ICD-10-CM

## 2023-07-24 DIAGNOSIS — I15.9 SECONDARY HYPERTENSION: ICD-10-CM

## 2023-07-24 DIAGNOSIS — I10 PRIMARY HYPERTENSION: ICD-10-CM

## 2023-07-24 DIAGNOSIS — R94.31 ABNORMAL EKG: ICD-10-CM

## 2023-07-24 LAB
ALBUMIN SERPL BCP-MCNC: 4.4 G/DL (ref 3.2–4.9)
ALBUMIN/GLOB SERPL: 1.6 G/DL
ALP SERPL-CCNC: 61 U/L (ref 30–99)
ALT SERPL-CCNC: 26 U/L (ref 2–50)
ANION GAP SERPL CALC-SCNC: 12 MMOL/L (ref 7–16)
AST SERPL-CCNC: 20 U/L (ref 12–45)
BASOPHILS # BLD AUTO: 0.7 % (ref 0–1.8)
BASOPHILS # BLD: 0.03 K/UL (ref 0–0.12)
BILIRUB SERPL-MCNC: 0.7 MG/DL (ref 0.1–1.5)
BUN SERPL-MCNC: 12 MG/DL (ref 8–22)
CALCIUM ALBUM COR SERPL-MCNC: 8.9 MG/DL (ref 8.5–10.5)
CALCIUM SERPL-MCNC: 9.2 MG/DL (ref 8.4–10.2)
CHLORIDE SERPL-SCNC: 101 MMOL/L (ref 96–112)
CO2 SERPL-SCNC: 24 MMOL/L (ref 20–33)
CREAT SERPL-MCNC: 0.83 MG/DL (ref 0.5–1.4)
EKG IMPRESSION: NORMAL
EKG IMPRESSION: NORMAL
EOSINOPHIL # BLD AUTO: 0.03 K/UL (ref 0–0.51)
EOSINOPHIL NFR BLD: 0.7 % (ref 0–6.9)
ERYTHROCYTE [DISTWIDTH] IN BLOOD BY AUTOMATED COUNT: 44.3 FL (ref 35.9–50)
GFR SERPLBLD CREATININE-BSD FMLA CKD-EPI: 101 ML/MIN/1.73 M 2
GLOBULIN SER CALC-MCNC: 2.7 G/DL (ref 1.9–3.5)
GLUCOSE SERPL-MCNC: 77 MG/DL (ref 65–99)
HCT VFR BLD AUTO: 46.6 % (ref 42–52)
HGB BLD-MCNC: 15.8 G/DL (ref 14–18)
IMM GRANULOCYTES # BLD AUTO: 0.01 K/UL (ref 0–0.11)
IMM GRANULOCYTES NFR BLD AUTO: 0.2 % (ref 0–0.9)
LYMPHOCYTES # BLD AUTO: 1.6 K/UL (ref 1–4.8)
LYMPHOCYTES NFR BLD: 37 % (ref 22–41)
MCH RBC QN AUTO: 30.3 PG (ref 27–33)
MCHC RBC AUTO-ENTMCNC: 33.9 G/DL (ref 32.3–36.5)
MCV RBC AUTO: 89.3 FL (ref 81.4–97.8)
MONOCYTES # BLD AUTO: 0.49 K/UL (ref 0–0.85)
MONOCYTES NFR BLD AUTO: 11.3 % (ref 0–13.4)
NEUTROPHILS # BLD AUTO: 2.16 K/UL (ref 1.82–7.42)
NEUTROPHILS NFR BLD: 50.1 % (ref 44–72)
NRBC # BLD AUTO: 0 K/UL
NRBC BLD-RTO: 0 /100 WBC (ref 0–0.2)
PLATELET # BLD AUTO: 165 K/UL (ref 164–446)
PMV BLD AUTO: 9 FL (ref 9–12.9)
POTASSIUM SERPL-SCNC: 4.1 MMOL/L (ref 3.6–5.5)
PROT SERPL-MCNC: 7.1 G/DL (ref 6–8.2)
RBC # BLD AUTO: 5.22 M/UL (ref 4.7–6.1)
SODIUM SERPL-SCNC: 137 MMOL/L (ref 135–145)
TROPONIN T SERPL-MCNC: 12 NG/L (ref 6–19)
TROPONIN T SERPL-MCNC: <6 NG/L (ref 6–19)
WBC # BLD AUTO: 4.3 K/UL (ref 4.8–10.8)

## 2023-07-24 PROCEDURE — 71045 X-RAY EXAM CHEST 1 VIEW: CPT

## 2023-07-24 PROCEDURE — 73030 X-RAY EXAM OF SHOULDER: CPT | Mod: LT

## 2023-07-24 PROCEDURE — 36415 COLL VENOUS BLD VENIPUNCTURE: CPT

## 2023-07-24 PROCEDURE — 84484 ASSAY OF TROPONIN QUANT: CPT

## 2023-07-24 PROCEDURE — 99223 1ST HOSP IP/OBS HIGH 75: CPT | Performed by: HOSPITALIST

## 2023-07-24 PROCEDURE — 93005 ELECTROCARDIOGRAM TRACING: CPT | Performed by: EMERGENCY MEDICINE

## 2023-07-24 PROCEDURE — 80053 COMPREHEN METABOLIC PANEL: CPT

## 2023-07-24 PROCEDURE — G0378 HOSPITAL OBSERVATION PER HR: HCPCS

## 2023-07-24 PROCEDURE — 700102 HCHG RX REV CODE 250 W/ 637 OVERRIDE(OP): Performed by: HOSPITALIST

## 2023-07-24 PROCEDURE — 85025 COMPLETE CBC W/AUTO DIFF WBC: CPT

## 2023-07-24 PROCEDURE — A9270 NON-COVERED ITEM OR SERVICE: HCPCS | Performed by: HOSPITALIST

## 2023-07-24 PROCEDURE — 93005 ELECTROCARDIOGRAM TRACING: CPT

## 2023-07-24 PROCEDURE — 99285 EMERGENCY DEPT VISIT HI MDM: CPT

## 2023-07-24 RX ORDER — ATORVASTATIN CALCIUM 80 MG/1
80 TABLET, FILM COATED ORAL NIGHTLY
COMMUNITY

## 2023-07-24 RX ORDER — PROMETHAZINE HYDROCHLORIDE 25 MG/1
12.5-25 SUPPOSITORY RECTAL EVERY 4 HOURS PRN
Status: DISCONTINUED | OUTPATIENT
Start: 2023-07-24 | End: 2023-07-25 | Stop reason: HOSPADM

## 2023-07-24 RX ORDER — CARVEDILOL 6.25 MG/1
6.25 TABLET ORAL 2 TIMES DAILY WITH MEALS
COMMUNITY

## 2023-07-24 RX ORDER — ONDANSETRON 2 MG/ML
4 INJECTION INTRAMUSCULAR; INTRAVENOUS EVERY 4 HOURS PRN
Status: DISCONTINUED | OUTPATIENT
Start: 2023-07-24 | End: 2023-07-25 | Stop reason: HOSPADM

## 2023-07-24 RX ORDER — ATORVASTATIN CALCIUM 40 MG/1
80 TABLET, FILM COATED ORAL NIGHTLY
Status: DISCONTINUED | OUTPATIENT
Start: 2023-07-24 | End: 2023-07-25 | Stop reason: HOSPADM

## 2023-07-24 RX ORDER — CLOPIDOGREL BISULFATE 75 MG/1
75 TABLET ORAL DAILY
Status: DISCONTINUED | OUTPATIENT
Start: 2023-07-25 | End: 2023-07-25 | Stop reason: HOSPADM

## 2023-07-24 RX ORDER — CLOPIDOGREL BISULFATE 75 MG/1
75 TABLET ORAL DAILY
COMMUNITY

## 2023-07-24 RX ORDER — PROMETHAZINE HYDROCHLORIDE 25 MG/1
12.5-25 TABLET ORAL EVERY 4 HOURS PRN
Status: DISCONTINUED | OUTPATIENT
Start: 2023-07-24 | End: 2023-07-25 | Stop reason: HOSPADM

## 2023-07-24 RX ORDER — POLYETHYLENE GLYCOL 3350 17 G/17G
1 POWDER, FOR SOLUTION ORAL
Status: DISCONTINUED | OUTPATIENT
Start: 2023-07-24 | End: 2023-07-25 | Stop reason: HOSPADM

## 2023-07-24 RX ORDER — OXYCODONE HYDROCHLORIDE 5 MG/1
2.5 TABLET ORAL
Status: DISCONTINUED | OUTPATIENT
Start: 2023-07-24 | End: 2023-07-25 | Stop reason: HOSPADM

## 2023-07-24 RX ORDER — LISINOPRIL 10 MG/1
10 TABLET ORAL DAILY
COMMUNITY

## 2023-07-24 RX ORDER — LISINOPRIL 5 MG/1
10 TABLET ORAL DAILY
Status: DISCONTINUED | OUTPATIENT
Start: 2023-07-25 | End: 2023-07-25 | Stop reason: HOSPADM

## 2023-07-24 RX ORDER — MORPHINE SULFATE 4 MG/ML
2 INJECTION INTRAVENOUS
Status: DISCONTINUED | OUTPATIENT
Start: 2023-07-24 | End: 2023-07-25 | Stop reason: HOSPADM

## 2023-07-24 RX ORDER — OXYCODONE HYDROCHLORIDE 5 MG/1
5 TABLET ORAL
Status: DISCONTINUED | OUTPATIENT
Start: 2023-07-24 | End: 2023-07-25 | Stop reason: HOSPADM

## 2023-07-24 RX ORDER — ACETAMINOPHEN 325 MG/1
650 TABLET ORAL EVERY 6 HOURS PRN
Status: DISCONTINUED | OUTPATIENT
Start: 2023-07-24 | End: 2023-07-25 | Stop reason: HOSPADM

## 2023-07-24 RX ORDER — HYDRALAZINE HYDROCHLORIDE 20 MG/ML
20 INJECTION INTRAMUSCULAR; INTRAVENOUS EVERY 6 HOURS PRN
Status: DISCONTINUED | OUTPATIENT
Start: 2023-07-24 | End: 2023-07-25 | Stop reason: HOSPADM

## 2023-07-24 RX ORDER — ASPIRIN 81 MG/1
81 TABLET ORAL DAILY
Status: DISCONTINUED | OUTPATIENT
Start: 2023-07-25 | End: 2023-07-25 | Stop reason: HOSPADM

## 2023-07-24 RX ORDER — BISACODYL 10 MG
10 SUPPOSITORY, RECTAL RECTAL
Status: DISCONTINUED | OUTPATIENT
Start: 2023-07-24 | End: 2023-07-25 | Stop reason: HOSPADM

## 2023-07-24 RX ORDER — PROCHLORPERAZINE EDISYLATE 5 MG/ML
5-10 INJECTION INTRAMUSCULAR; INTRAVENOUS EVERY 4 HOURS PRN
Status: DISCONTINUED | OUTPATIENT
Start: 2023-07-24 | End: 2023-07-25 | Stop reason: HOSPADM

## 2023-07-24 RX ORDER — ONDANSETRON 4 MG/1
4 TABLET, ORALLY DISINTEGRATING ORAL EVERY 4 HOURS PRN
Status: DISCONTINUED | OUTPATIENT
Start: 2023-07-24 | End: 2023-07-25 | Stop reason: HOSPADM

## 2023-07-24 RX ORDER — AMOXICILLIN 250 MG
2 CAPSULE ORAL 2 TIMES DAILY
Status: DISCONTINUED | OUTPATIENT
Start: 2023-07-24 | End: 2023-07-25 | Stop reason: HOSPADM

## 2023-07-24 RX ADMIN — ATORVASTATIN CALCIUM 80 MG: 40 TABLET, FILM COATED ORAL at 21:30

## 2023-07-24 ASSESSMENT — HEART SCORE
HISTORY: MODERATELY SUSPICIOUS
TROPONIN: LESS THAN OR EQUAL TO NORMAL LIMIT
HEART SCORE: 5
AGE: 45-64
RISK FACTORS: >2 RISK FACTORS OR HX OF ATHEROSCLEROTIC DISEASE
ECG: NON-SPECIFIC REPOLARIZATION DISTURBANCE

## 2023-07-24 ASSESSMENT — ENCOUNTER SYMPTOMS
FEVER: 0
CHILLS: 0
NERVOUS/ANXIOUS: 0
BRUISES/BLEEDS EASILY: 0
ABDOMINAL PAIN: 0
FOCAL WEAKNESS: 0
EYE REDNESS: 0
STRIDOR: 0
FLANK PAIN: 0
COUGH: 0
VOMITING: 0
MYALGIAS: 0
SHORTNESS OF BREATH: 0
EYE DISCHARGE: 0

## 2023-07-24 ASSESSMENT — FIBROSIS 4 INDEX: FIB4 SCORE: 1.15

## 2023-07-24 NOTE — ED TRIAGE NOTES
"58 yr old male to triage with daughter  Chief Complaint   Patient presents with    Chest Pain     Patient report of chest pain for the past 2 1/2 months and hurts when he coughs, sneezes or goes to the bathroom (number 2)  denies feeling short of breath. No lightheadedness or dizziness.  Patient took some medication last night for his heart and made him feel lightheaded.      /86   Pulse 60   Temp 36.8 °C (98.2 °F) (Oral)   Resp 14   Ht 1.702 m (5' 7\")   Wt 84.6 kg (186 lb 8.2 oz)   SpO2 97%   BMI 29.21 kg/m²     "

## 2023-07-25 ENCOUNTER — APPOINTMENT (OUTPATIENT)
Dept: CARDIOLOGY | Facility: MEDICAL CENTER | Age: 58
End: 2023-07-25
Attending: INTERNAL MEDICINE

## 2023-07-25 ENCOUNTER — APPOINTMENT (OUTPATIENT)
Dept: RADIOLOGY | Facility: MEDICAL CENTER | Age: 58
End: 2023-07-25
Attending: HOSPITALIST

## 2023-07-25 VITALS
HEART RATE: 70 BPM | DIASTOLIC BLOOD PRESSURE: 71 MMHG | RESPIRATION RATE: 18 BRPM | TEMPERATURE: 97.9 F | BODY MASS INDEX: 29.17 KG/M2 | HEIGHT: 67 IN | WEIGHT: 185.85 LBS | SYSTOLIC BLOOD PRESSURE: 115 MMHG | OXYGEN SATURATION: 96 %

## 2023-07-25 PROBLEM — M25.519 SHOULDER PAIN: Status: ACTIVE | Noted: 2023-07-25

## 2023-07-25 LAB
ALBUMIN SERPL BCP-MCNC: 3.5 G/DL (ref 3.2–4.9)
ALBUMIN/GLOB SERPL: 1.5 G/DL
ALP SERPL-CCNC: 49 U/L (ref 30–99)
ALT SERPL-CCNC: 20 U/L (ref 2–50)
ANION GAP SERPL CALC-SCNC: 11 MMOL/L (ref 7–16)
AST SERPL-CCNC: 16 U/L (ref 12–45)
BILIRUB SERPL-MCNC: 0.8 MG/DL (ref 0.1–1.5)
BUN SERPL-MCNC: 11 MG/DL (ref 8–22)
CALCIUM ALBUM COR SERPL-MCNC: 8.1 MG/DL (ref 8.5–10.5)
CALCIUM SERPL-MCNC: 7.7 MG/DL (ref 8.4–10.2)
CHLORIDE SERPL-SCNC: 105 MMOL/L (ref 96–112)
CHOLEST SERPL-MCNC: 113 MG/DL (ref 100–199)
CO2 SERPL-SCNC: 21 MMOL/L (ref 20–33)
CREAT SERPL-MCNC: 0.7 MG/DL (ref 0.5–1.4)
ERYTHROCYTE [DISTWIDTH] IN BLOOD BY AUTOMATED COUNT: 43.4 FL (ref 35.9–50)
ERYTHROCYTE [DISTWIDTH] IN BLOOD BY AUTOMATED COUNT: 43.8 FL (ref 35.9–50)
GFR SERPLBLD CREATININE-BSD FMLA CKD-EPI: 106 ML/MIN/1.73 M 2
GLOBULIN SER CALC-MCNC: 2.3 G/DL (ref 1.9–3.5)
GLUCOSE SERPL-MCNC: 102 MG/DL (ref 65–99)
HCT VFR BLD AUTO: 44.2 % (ref 42–52)
HCT VFR BLD AUTO: 44.5 % (ref 42–52)
HDLC SERPL-MCNC: 36 MG/DL
HGB BLD-MCNC: 15.2 G/DL (ref 14–18)
HGB BLD-MCNC: 15.2 G/DL (ref 14–18)
LDLC SERPL CALC-MCNC: 47 MG/DL
LV EJECT FRACT MOD 2C 99903: 65.78
LV EJECT FRACT MOD 4C 99902: 61.89
LV EJECT FRACT MOD BP 99901: 63.15
MAGNESIUM SERPL-MCNC: 1.6 MG/DL (ref 1.5–2.5)
MCH RBC QN AUTO: 30.2 PG (ref 27–33)
MCH RBC QN AUTO: 30.5 PG (ref 27–33)
MCHC RBC AUTO-ENTMCNC: 34.2 G/DL (ref 32.3–36.5)
MCHC RBC AUTO-ENTMCNC: 34.4 G/DL (ref 32.3–36.5)
MCV RBC AUTO: 88.5 FL (ref 81.4–97.8)
MCV RBC AUTO: 88.6 FL (ref 81.4–97.8)
PLATELET # BLD AUTO: 130 K/UL (ref 164–446)
PLATELET # BLD AUTO: 141 K/UL (ref 164–446)
PMV BLD AUTO: 8.7 FL (ref 9–12.9)
PMV BLD AUTO: 8.8 FL (ref 9–12.9)
POTASSIUM SERPL-SCNC: 3.2 MMOL/L (ref 3.6–5.5)
PROT SERPL-MCNC: 5.8 G/DL (ref 6–8.2)
RBC # BLD AUTO: 4.99 M/UL (ref 4.7–6.1)
RBC # BLD AUTO: 5.03 M/UL (ref 4.7–6.1)
SODIUM SERPL-SCNC: 137 MMOL/L (ref 135–145)
TRIGL SERPL-MCNC: 149 MG/DL (ref 0–149)
TROPONIN T SERPL-MCNC: <6 NG/L (ref 6–19)
TSH SERPL DL<=0.005 MIU/L-ACNC: 2.91 UIU/ML (ref 0.38–5.33)
WBC # BLD AUTO: 4 K/UL (ref 4.8–10.8)
WBC # BLD AUTO: 4.7 K/UL (ref 4.8–10.8)

## 2023-07-25 PROCEDURE — 78452 HT MUSCLE IMAGE SPECT MULT: CPT

## 2023-07-25 PROCEDURE — 36415 COLL VENOUS BLD VENIPUNCTURE: CPT

## 2023-07-25 PROCEDURE — 700102 HCHG RX REV CODE 250 W/ 637 OVERRIDE(OP): Performed by: INTERNAL MEDICINE

## 2023-07-25 PROCEDURE — 96365 THER/PROPH/DIAG IV INF INIT: CPT

## 2023-07-25 PROCEDURE — 85027 COMPLETE CBC AUTOMATED: CPT

## 2023-07-25 PROCEDURE — 84484 ASSAY OF TROPONIN QUANT: CPT

## 2023-07-25 PROCEDURE — 700102 HCHG RX REV CODE 250 W/ 637 OVERRIDE(OP): Performed by: HOSPITALIST

## 2023-07-25 PROCEDURE — 93306 TTE W/DOPPLER COMPLETE: CPT | Mod: 26 | Performed by: INTERNAL MEDICINE

## 2023-07-25 PROCEDURE — G0378 HOSPITAL OBSERVATION PER HR: HCPCS

## 2023-07-25 PROCEDURE — 700111 HCHG RX REV CODE 636 W/ 250 OVERRIDE (IP): Mod: JZ | Performed by: INTERNAL MEDICINE

## 2023-07-25 PROCEDURE — 96366 THER/PROPH/DIAG IV INF ADDON: CPT

## 2023-07-25 PROCEDURE — 93018 CV STRESS TEST I&R ONLY: CPT | Performed by: INTERNAL MEDICINE

## 2023-07-25 PROCEDURE — A9270 NON-COVERED ITEM OR SERVICE: HCPCS | Performed by: HOSPITALIST

## 2023-07-25 PROCEDURE — 93306 TTE W/DOPPLER COMPLETE: CPT

## 2023-07-25 PROCEDURE — 99239 HOSP IP/OBS DSCHRG MGMT >30: CPT | Performed by: INTERNAL MEDICINE

## 2023-07-25 PROCEDURE — 84443 ASSAY THYROID STIM HORMONE: CPT

## 2023-07-25 PROCEDURE — 78452 HT MUSCLE IMAGE SPECT MULT: CPT | Mod: 26 | Performed by: INTERNAL MEDICINE

## 2023-07-25 PROCEDURE — 83735 ASSAY OF MAGNESIUM: CPT

## 2023-07-25 PROCEDURE — A9270 NON-COVERED ITEM OR SERVICE: HCPCS | Performed by: INTERNAL MEDICINE

## 2023-07-25 PROCEDURE — 80053 COMPREHEN METABOLIC PANEL: CPT

## 2023-07-25 PROCEDURE — 80061 LIPID PANEL: CPT

## 2023-07-25 PROCEDURE — 700111 HCHG RX REV CODE 636 W/ 250 OVERRIDE (IP)

## 2023-07-25 RX ORDER — OMEPRAZOLE 20 MG/1
20 CAPSULE, DELAYED RELEASE ORAL DAILY
Status: DISCONTINUED | OUTPATIENT
Start: 2023-07-25 | End: 2023-07-25 | Stop reason: HOSPADM

## 2023-07-25 RX ORDER — POTASSIUM CHLORIDE 20 MEQ/1
40 TABLET, EXTENDED RELEASE ORAL ONCE
Status: COMPLETED | OUTPATIENT
Start: 2023-07-25 | End: 2023-07-25

## 2023-07-25 RX ORDER — OMEPRAZOLE 20 MG/1
20 CAPSULE, DELAYED RELEASE ORAL DAILY
Qty: 30 CAPSULE | Refills: 0 | Status: SHIPPED | OUTPATIENT
Start: 2023-07-25

## 2023-07-25 RX ORDER — REGADENOSON 0.08 MG/ML
INJECTION, SOLUTION INTRAVENOUS
Status: DISCONTINUED
Start: 2023-07-25 | End: 2023-07-25

## 2023-07-25 RX ORDER — MAGNESIUM SULFATE HEPTAHYDRATE 40 MG/ML
2 INJECTION, SOLUTION INTRAVENOUS ONCE
Status: COMPLETED | OUTPATIENT
Start: 2023-07-25 | End: 2023-07-25

## 2023-07-25 RX ORDER — OMEPRAZOLE 20 MG/1
20 CAPSULE, DELAYED RELEASE ORAL DAILY
Qty: 30 CAPSULE | Refills: 0 | Status: CANCELLED | DISCHARGE
Start: 2023-07-25

## 2023-07-25 RX ADMIN — ASPIRIN 81 MG: 81 TABLET, COATED ORAL at 06:00

## 2023-07-25 RX ADMIN — SENNOSIDES AND DOCUSATE SODIUM 2 TABLET: 50; 8.6 TABLET ORAL at 06:00

## 2023-07-25 RX ADMIN — MAGNESIUM SULFATE HEPTAHYDRATE 2 G: 2 INJECTION, SOLUTION INTRAVENOUS at 06:30

## 2023-07-25 RX ADMIN — REGADENOSON 0.4 MG: 0.08 INJECTION, SOLUTION INTRAVENOUS at 10:03

## 2023-07-25 RX ADMIN — CLOPIDOGREL BISULFATE 75 MG: 75 TABLET ORAL at 06:09

## 2023-07-25 RX ADMIN — POTASSIUM CHLORIDE 40 MEQ: 20 TABLET, EXTENDED RELEASE ORAL at 06:30

## 2023-07-25 ASSESSMENT — FIBROSIS 4 INDEX: FIB4 SCORE: 1.5

## 2023-07-25 ASSESSMENT — PAIN DESCRIPTION - PAIN TYPE
TYPE: ACUTE PAIN

## 2023-07-25 NOTE — ED NOTES
Dinner provided night meds given pt aware of plan to admit, pt aware of no rooms at this time will remain at bedside   Patients daughter is calling stating that they are unsure if he should be taking cholesterol medication. There is no one listed on his chart for anyone we can talk to. The daughter states the patient will be out of town and asked if we can leave a detailed vm on his home phone recorder so she can listen to it. But the patient is out of town just as a heads up

## 2023-07-25 NOTE — DISCHARGE SUMMARY
"Discharge Summary    CHIEF COMPLAINT ON ADMISSION  Chief Complaint   Patient presents with    Chest Pain     Patient report of chest pain for the past 2 1/2 months and hurts when he coughs, sneezes or goes to the bathroom (number 2)  denies feeling short of breath. No lightheadedness or dizziness.  Patient took some medication last night for his heart and made him feel lightheaded.        Reason for Admission  Chest Pain, Shoulder Pain     Admission Date  7/24/2023    CODE STATUS  Full Code    HPI & HOSPITAL COURSE  As per chart review:  \"58 y.o. male with a past medical history of coronary artery disease, primary hypertension and hyperlipidemia who presented 7/24/2023 with chest pain.  Patient reports that he has not been feeling well for the past 2 months.  He has been having intermittent episodes of chest pain.  The pain is mostly retrosternal location described as having a heavy character.  He also reports having intermittent palpitations and lightheadedness.\"    The patient was admitted for further management and care and ruling out cardiac work-up with stress test and echocardiogram.  The patient remained with negative troponins.  At the time of evaluation this morning the patient was not complaining of any more chest pain.  The patient mentions that this chest pain comes and goes and has been going on for several months.  He also attributes it to fluid as he sometimes gets the pain after food.  This could be GERD so we have started the patient on omeprazole.    The patient had a stress test which reported fixed defect and no active ischemia.  The patient already on medical treatment.  The patient states that he has not followed with a cardiologist in a long time.  We have placed referral to cardiology as an outpatient.    The patient also complaining on on and off shoulder pain for several months.  At the time of my evaluation this morning not complaining of overt pain. Patient had Xray while hospitalized. We " have placed referral to orthopedic surgery. Patient will require close follow up as outpatient.    The patient will require close follow up with PCP and Cardiology as outpatient.       Therefore, he is discharged in fair and stable condition to home with close outpatient follow-up.    The patient recovered much more quickly than anticipated on admission.    Discharge Date  07/25/2023    FOLLOW UP ITEMS POST DISCHARGE  The patient will require close follow-up with PCP and cardiology as an outpatient.    DISCHARGE DIAGNOSES  Principal Problem:    Chest pain (POA: Yes)  Active Problems:    Hyperlipemia (POA: Yes)    HTN (hypertension) (POA: Yes)    Bradycardia (POA: Yes)    Abnormal EKG (POA: Yes)  Resolved Problems:    * No resolved hospital problems. *      FOLLOW UP    ANDRÉS Orozco  [address] [place] [id number]  [phone]    Schedule an appointment as soon as possible for a visit      Cardiology    Schedule an appointment as soon as possible for a visit      Orthopedic Surgery    Schedule an appointment as soon as possible for a visit        MEDICATIONS ON DISCHARGE     Medication List        START taking these medications        Instructions   omeprazole 20 MG delayed-release capsule  Commonly known as: PriLOSEC   Take 1 Capsule by mouth every day.  Dose: 20 mg            CONTINUE taking these medications        Instructions   atorvastatin 80 MG tablet  Commonly known as: Lipitor   Take 80 mg by mouth every evening.  Dose: 80 mg     carvedilol 6.25 MG Tabs  Commonly known as: Coreg   Take 6.25 mg by mouth 2 times a day with meals.  Dose: 6.25 mg     clopidogrel 75 MG Tabs  Commonly known as: Plavix   Take 75 mg by mouth every day.  Dose: 75 mg     lisinopril 10 MG Tabs  Commonly known as: Prinivil   Take 10 mg by mouth every day.  Dose: 10 mg     TYLENOL PO   Take 1 Tablet by mouth 2 times a day as needed (For pain). Pt is not sure the strength (OTC)  Dose: 1 Tablet            STOP taking these  medications      CALCIUM PO              Allergies  No Known Allergies    DIET  Orders Placed This Encounter   Procedures    Diet Order Diet: Regular; Miscellaneous modifications: (optional): No Decaf, No Caffeine(for test) (No caffeine for 12 hours prior to exam (decaf, coffee, cola, tea, chocolate))     Standing Status:   Standing     Number of Occurrences:   1     Order Specific Question:   Diet:     Answer:   Regular [1]     Order Specific Question:   Miscellaneous modifications: (optional)     Answer:   No Decaf, No Caffeine(for test) [11]     Comments:   No caffeine for 12 hours prior to exam (decaf, coffee, cola, tea, chocolate)       ACTIVITY  As tolerated.  Weight bearing as tolerated    CONSULTATIONS  None    PROCEDURES    ECHOCARDIOGRAM  CONCLUSIONS  Normal left ventricular size, thickness, and systolic function.  The left ventricular ejection fraction is visually estimated to be 60-  65%.  Normal diastolic function.  Normal right ventricular size and systolic function.  Right ventricular systolic pressure is estimated to be 23 mmHg.  Normal left atrial size.  No significant valvular abnormalities.   Normal inferior vena cava size and inspiratory collapse.      STRESS TEST:  NUCLEAR IMAGING INTERPRETATION   Moderate sized, fixed defect of moderate to severe severity in the basal and    mid inferior and inferior lateral wall consistent with prior RCA territory    infarct.   There is no significant reversibility suggestive of active ischemia (SDS = 0).   Preserved left ventricular systolic function.Normal TID ratio.   ECG INTERPRETATION   Nondiagnostic due to use of pharmacological stress agent.        NM-CARDIAC STRESS TEST   Final Result      EC-ECHOCARDIOGRAM COMPLETE W/O CONT   Final Result      DX-SHOULDER 2+ LEFT   Final Result      1.  No acute displaced fracture.   2.  There are changes of calcific tendinopathy of the rotator cuff attachment with other mild degenerative changes in the shoulder.       DX-CHEST-PORTABLE (1 VIEW)   Final Result         1. No acute cardiopulmonary abnormalities are identified.           LABORATORY  Lab Results   Component Value Date    SODIUM 137 07/25/2023    POTASSIUM 3.2 (L) 07/25/2023    CHLORIDE 105 07/25/2023    CO2 21 07/25/2023    GLUCOSE 102 (H) 07/25/2023    BUN 11 07/25/2023    CREATININE 0.70 07/25/2023    CREATININE 0.9 05/02/2008        Lab Results   Component Value Date    WBC 4.0 (L) 07/25/2023    HEMOGLOBIN 15.2 07/25/2023    HEMATOCRIT 44.5 07/25/2023    PLATELETCT 141 (L) 07/25/2023        Total time of the discharge process exceeds 36 minutes.

## 2023-07-25 NOTE — ASSESSMENT & PLAN NOTE
EKG shows sinus bradycardia with a rate of 51, there is no ST elevation, there is a Q wave in inferior leads II, 3, aVF with T wave inversion in leads III, aVF.  QTc is 423.  Initial troponin is within normal limits, I will trend  I ordered Stat EKG and troponin for recurrence of chest pain.   I will place on continuous cardiac monitoring.  Plan for stress test in the morning [ordered] as long as there are no significant EKG changes or significant troponin elevation   Treadmill stress test ordered [does not want to do a chemical stress test]

## 2023-07-25 NOTE — ASSESSMENT & PLAN NOTE
EKG shows sinus bradycardia with a rate of 51, there is no ST elevation, there is a Q wave in inferior leads II, 3, aVF with T wave inversion in leads III, aVF.  QTc is 423.  I will place on continuous cardiac monitoring  I will check and trend troponins  I will check echocardiography

## 2023-07-25 NOTE — H&P
[facility] Medicine History & Physical Note    Date of Service  7/24/2023    Primary Care Physician  ANDRÉS Orozco    Consultants  None     Code Status  Full Code    Chief Complaint  Chief Complaint   Patient presents with    Chest Pain     Patient report of chest pain for the past 2 1/2 months and hurts when he coughs, sneezes or goes to the bathroom (number 2)  denies feeling short of breath. No lightheadedness or dizziness.  Patient took some medication last night for his heart and made him feel lightheaded.      History of Presenting Illness  Haily Gonzalez is a 58 y.o. male with a past medical history of coronary artery disease, primary hypertension and hyperlipidemia who presented 7/24/2023 with chest pain.  Patient reports that he has not been feeling well for the past 2 months.  He has been having intermittent episodes of chest pain.  The pain is mostly retrosternal location described as having a heavy character.  He also reports having intermittent palpitations and lightheadedness.         I discussed the plan of care with emergency department physician, the patient and patient daughter present at bedside in the emergency room    Review of Systems  Review of Systems   Constitutional:  Negative for chills and fever.   Eyes:  Negative for discharge and redness.   Respiratory:  Negative for cough, shortness of breath and stridor.    Cardiovascular:  Positive for chest pain. Negative for leg swelling.   Gastrointestinal:  Negative for abdominal pain and vomiting.   Genitourinary:  Negative for flank pain.   Musculoskeletal:  Negative for myalgias.   Skin: Negative.    Neurological:  Negative for focal weakness.   Endo/Heme/Allergies:  Does not bruise/bleed easily.   Psychiatric/Behavioral:  The patient is not nervous/anxious.      Past Medical History   has a past medical history of CAD (coronary artery disease), DDD (degenerative disc disease), lumbar (6/10/2009), Hypertension, and Myocardial  infarct (HCC).    Surgical History   has a past surgical history that includes zzz cardiac cath (1/31/16).     Family History  family history includes Diabetes in his mother; Heart Attack (age of onset: 70) in his father; Hypertension in his father.      Social History   reports that he quit smoking about 7 years ago. He has never used smokeless tobacco. He reports current alcohol use of about 1.2 oz of alcohol per week. He reports that he does not use drugs.    Allergies  No Known Allergies    Medications  Prior to Admission Medications   Prescriptions Last Dose Informant Patient Reported? Taking?   atorvastatin (LIPITOR) 80 MG tablet 7/23/2023 at 2000  Yes Yes   Sig: Take 80 mg by mouth every evening.   carvedilol (COREG) 6.25 MG Tab 7/24/2023 at 0900  Yes Yes   Sig: Take 6.25 mg by mouth 2 times a day with meals.   clopidogrel (PLAVIX) 75 MG Tab 7/24/2023 at 0900  Yes Yes   Sig: Take 75 mg by mouth every day.   lisinopril (PRINIVIL) 10 MG Tab 7/24/2023 at 0900  Yes Yes   Sig: Take 10 mg by mouth every day.      Facility-Administered Medications: None     Physical Exam  Temp:  [36.8 °C (98.2 °F)] 36.8 °C (98.2 °F)  Pulse:  [51-60] 52  Resp:  [12-18] 18  BP: (113-131)/(77-89) 120/83  SpO2:  [97 %-100 %] 100 %  Blood Pressure: 120/83   Temperature: 36.8 °C (98.2 °F)   Pulse: (!) 52   Respiration: 18   Pulse Oximetry: 100 %     Physical Exam  Constitutional:       General: He is not in acute distress.     Appearance: He is not ill-appearing or diaphoretic.   HENT:      Head: Atraumatic.      Right Ear: External ear normal.      Left Ear: External ear normal.      Nose: No congestion or rhinorrhea.      Mouth/Throat:      Mouth: Mucous membranes are moist.   Eyes:      General: No scleral icterus.        Right eye: No discharge.         Left eye: No discharge.      Pupils: Pupils are equal, round, and reactive to light.   Cardiovascular:      Rate and Rhythm: Normal rate and regular rhythm.   Pulmonary:      Effort:  Pulmonary effort is normal.   Abdominal:      General: There is no distension.   Musculoskeletal:      Cervical back: Neck supple. No rigidity. No muscular tenderness.      Right lower leg: No edema.      Left lower leg: No edema.   Skin:     Coloration: Skin is not jaundiced or pale.   Neurological:      Mental Status: He is alert and oriented to person, place, and time.      Coordination: Coordination normal.   Psychiatric:         Mood and Affect: Mood normal.         Behavior: Behavior normal.       Laboratory:  Recent Labs     07/24/23  1350   WBC 4.3*   RBC 5.22   HEMOGLOBIN 15.8   HEMATOCRIT 46.6   MCV 89.3   MCH 30.3   MCHC 33.9   RDW 44.3   PLATELETCT 165   MPV 9.0     Recent Labs     07/24/23  1350   SODIUM 137   POTASSIUM 4.1   CHLORIDE 101   CO2 24   GLUCOSE 77   BUN 12   CREATININE 0.83   CALCIUM 9.2     Recent Labs     07/24/23  1350   ALTSGPT 26   ASTSGOT 20   ALKPHOSPHAT 61   TBILIRUBIN 0.7   GLUCOSE 77         No results for input(s): NTPROBNP in the last 72 hours.      Recent Labs     07/24/23  1350 07/24/23  1705 07/24/23  1907   TROPONINT 12 <6 <6     Imaging:  DX-SHOULDER 2+ LEFT   Final Result      1.  No acute displaced fracture.   2.  There are changes of calcific tendinopathy of the rotator cuff attachment with other mild degenerative changes in the shoulder.      DX-CHEST-PORTABLE (1 VIEW)   Final Result         1. No acute cardiopulmonary abnormalities are identified.      NM-CARDIAC STRESS TEST    (Results Pending)   EC-ECHOCARDIOGRAM COMPLETE W/O CONT    (Results Pending)     I patient reviewed patient EKG shows sinus bradycardia with a rate of 51, there is no ST elevation, there is a Q wave in inferior leads II, 3, aVF with T wave inversion in leads III, aVF.  QTc is 423.    Assessment/Plan:  Justification for Admission Status  I anticipate this patient is appropriate for observation status at this time because likely discharge after 1 midnight    Patient will need a  bed on  telemetry/cardiology service.  The patient has chest pain    * Chest pain- (present on admission)  Assessment & Plan  EKG shows sinus bradycardia with a rate of 51, there is no ST elevation, there is a Q wave in inferior leads II, 3, aVF with T wave inversion in leads III, aVF.  QTc is 423.  Initial troponin is within normal limits, I will trend  I ordered Stat EKG and troponin for recurrence of chest pain.   I will place on continuous cardiac monitoring.  Plan for stress test in the morning [ordered] as long as there are no significant EKG changes or significant troponin elevation   Treadmill stress test ordered [does not want to do a chemical stress test]     Abnormal EKG- (present on admission)  Assessment & Plan  EKG shows sinus bradycardia with a rate of 51, there is no ST elevation, there is a Q wave in inferior leads II, 3, aVF with T wave inversion in leads III, aVF.  QTc is 423.  I will place on continuous cardiac monitoring  I will check and trend troponins  I will check echocardiography     Bradycardia- (present on admission)  Assessment & Plan  Likely secondary to beta-blocker usage  I will place on continuous cardiac monitoring  I will check thyroid function test    HTN (hypertension)- (present on admission)  Assessment & Plan  Resume home lisinopril with hold parameters  We will hold carvedilol for the stress test  I will start as needed hydralazine for extreme hypertension    Hyperlipemia- (present on admission)  Assessment & Plan  Cardiac diet.  Resume rosuvastatin    VTE prophylaxis: SCDs/TEDs and Xarelto 10 mg daily as prophylaxis

## 2023-07-25 NOTE — DISCHARGE INSTRUCTIONS
Discharge Instructions    Discharged to home by car with relative. Discharged via wheelchair, hospital escort: Yes.  Special equipment needed: Not Applicable    Be sure to schedule a follow-up appointment with your primary care doctor or any specialists as instructed.     Discharge Plan:   Activity Level: Discussed  Confirmed Follow up Appointment: Patient to Call and Schedule Appointment  Confirmed Symptoms Management: Discussed  Medication Reconciliation Updated: Yes    I understand that a diet low in cholesterol, fat, and sodium is recommended for good health. Unless I have been given specific instructions below for another diet, I accept this instruction as my diet prescription.   Other diet: regualr    Special Instructions: None    -Is this patient being discharged with medication to prevent blood clots?  No    Is patient discharged on Warfarin / Coumadin?   No

## 2023-07-25 NOTE — ASSESSMENT & PLAN NOTE
Likely secondary to beta-blocker usage  I will place on continuous cardiac monitoring  I will check thyroid function test

## 2023-07-25 NOTE — PROGRESS NOTES
Pt down for treadmill stress test. Test attempted with patient reaching stage 4 before complaining of extreme fatigue and leg pain. Patient requests to stop exam. Treadmill exam aborted. Patient given the option of converting to pharmacological version of test with Lexiscan and he declines. Patient informed he can follow up with his PCP or cardiologist to possibly perform exam as an outpatient. Sent back to ER room via transport services.

## 2023-07-25 NOTE — PROGRESS NOTES
Discharge order written. IV removed, patient tolerated. All personal belongings are in possession. AVS printed, reviewed and copy signed and placed on the chart. Patient has no further questions. Discharged in satisfactory condition.Pt left unit with self via walking. Staff escort  RN.

## 2023-07-25 NOTE — ED PROVIDER NOTES
ED Provider Note    CHIEF COMPLAINT  Chief Complaint   Patient presents with    Chest Pain     Patient report of chest pain for the past 2 1/2 months and hurts when he coughs, sneezes or goes to the bathroom (number 2)  denies feeling short of breath. No lightheadedness or dizziness.  Patient took some medication last night for his heart and made him feel lightheaded.        EXTERNAL RECORDS REVIEWED  Inpatient Notes admission notes and consultation notes and Outpatient Notes primary care notes    HPI/ROS  LIMITATION TO HISTORY   Select: Language Mohawk,  Used   OUTSIDE HISTORIAN(S):  Family daughter    Haily Gonzalez is a 58 y.o. male who presents for evaluation of chest pain.  The patient's been having approximately 2 months of chest pain.  Describes a pressure in the chest which is sometimes worsens when he has bowel movements though he is actually having no abdominal pain associated with this.  At times he feels like his heart is racing and he feels lightheaded and wants to pass out but has not had a syncopal episode.  Reviewing the patient's old records he does have a history of coronary disease from 2016.  A cardiac catheterization was performed though it was unsuccessful in opening up the stenotic area.  The patient's been treated medicinally.  The patient currently denies: Fever, chills, URI symptoms, cough, sputum, abdominal pain, nausea, vomiting, hematemesis, melena hematochezia, pain or swelling lower extremities.  He also states he fell 6 months ago on his left shoulder has had continued pain.  No other acute symptomatology or complaints.    PAST MEDICAL HISTORY   has a past medical history of CAD (coronary artery disease), DDD (degenerative disc disease), lumbar (6/10/2009), Hypertension, and Myocardial infarct (HCC).    SURGICAL HISTORY   has a past surgical history that includes z cardiac cath (1/31/16).    FAMILY HISTORY  Family History   Problem Relation Age of Onset     "Diabetes Mother     Hypertension Father     Heart Attack Father 70        CAd       SOCIAL HISTORY  Social History     Tobacco Use    Smoking status: Former     Years: 10.00     Types: Cigarettes     Quit date: 2016     Years since quittin.4    Smokeless tobacco: Never   Vaping Use    Vaping Use: Never used   Substance and Sexual Activity    Alcohol use: Yes     Alcohol/week: 1.2 oz     Types: 2 Cans of beer per week    Drug use: No    Sexual activity: Yes       CURRENT MEDICATIONS  Home Medications       Reviewed by Zoie Yadav R.N. (Registered Nurse) on 23 at 1401  Med List Status: Complete     Medication Last Dose Status   atorvastatin (LIPITOR) 80 MG tablet 2023 Active   carvedilol (COREG) 6.25 MG Tab 2023 Active   clopidogrel (PLAVIX) 75 MG Tab 2023 Active   lisinopril (PRINIVIL) 10 MG Tab 2023 Active                    ALLERGIES  No Known Allergies    PHYSICAL EXAM  VITAL SIGNS: /77   Pulse (!) 51   Temp 36.8 °C (98.2 °F) (Oral)   Resp 12   Ht 1.702 m (5' 7\")   Wt 84.6 kg (186 lb 8.2 oz)   SpO2 99%   BMI 29.21 kg/m²    Constitutional: 58-year-old male, well developed, Well nourished, No acute distress, Non-toxic appearance.   HENT: Normocephalic, Atraumatic, Nares:Clear, Oropharynx: moist, well hydrated, posterior pharynx:clear   Eyes: PERRL, EOMI, Conjunctiva normal, No discharge.   Neck: Normal range of motion, No tenderness, Supple, No stridor.   Lymphatic: No lymphadenopathy noted.   Cardiovascular: Regular rate and rhythm without mumurs, gallups, rubs   Thorax & Lungs: Normal Equal breath sounds, No respiratory distress, No wheezing, no stridor, no rales. No chest tenderness.   Abdomen: Soft, nontender, nondistended, no organomegaly, positive bowel sounds normal in quality. No guarding or rebound.  Skin: Good skin turgor, pink, warm, dry. No rashes, petechiae, purpura. Normal capillary refill.   Back: No tenderness, No CVA tenderness.   Extremities: " Intact distal pulses, No edema, No tenderness, No cyanosis,  Vascular: Pulses are 2+, symmetric in the upper and lower extremities.  Musculoskeletal: Good range of motion in all major joints. No tenderness to palpation or major deformities noted.   Neurologic: Alert & oriented x 3,  No gross focal deficits noted.   Psychiatric: Affect normal, Judgment normal, Mood normal.       DIAGNOSTIC STUDIES / PROCEDURES  EKG  I have independently interpreted this EKG  Rate 60, rhythm sinus, right axis deviation, Q waves in 3 and aVF with inverted T waves, no acute STEMI, twelve-lead EKG, no acute change compared to previous tracing;    LABS  CBC and differential within normal limits; CMP within normal; troponin 12;    RADIOLOGY  I have independently interpreted the diagnostic imaging associated with this visit and am waiting the final reading from the radiologist.   My preliminary interpretation is as follows: No evidence of any foot abnormalities or bony abnormalities; chest x-ray did not show any acute abnormality;  Radiologist interpretation:   DX-SHOULDER 2+ LEFT   Final Result      1.  No acute displaced fracture.   2.  There are changes of calcific tendinopathy of the rotator cuff attachment with other mild degenerative changes in the shoulder.      DX-CHEST-PORTABLE (1 VIEW)   Final Result         1. No acute cardiopulmonary abnormalities are identified.            COURSE & MEDICAL DECISION MAKING        INITIAL ASSESSMENT, COURSE AND PLAN  Care Narrative: At this time, the patient presents for evaluation of chest pain.  The patient has a known history of heart disease.  Patient has a heart score of 5.  Based on the patient's history and presentation, patient meets criteria for admission for observation and work-up.  The patient remained stable while in the ED.  I have discussed the findings and treatment plan with the hospitalist on-call.  I spoke with the patient and his wife.  They indicate they are comfortable with  this explanation disposition.        ADDITIONAL PROBLEM LIST  1.  Hypertension  2.  Dyslipidemia  3.  Heart score 5    DISPOSITION AND DISCUSSIONS  I have discussed management of the patient with the following physicians and DOMENICO's: Hospitalist on-call    PLAN:  1.  The patient will be admitted for further monitoring, treatment, and care.      FINAL DIAGNOSIS  1. Acute chest pain    2. Secondary hypertension    3. Other sprain of left shoulder joint, initial encounter             Electronically signed by: Guy G Gansert, M.D., 7/24/2023 6:25 PM

## 2023-07-25 NOTE — ASSESSMENT & PLAN NOTE
Resume home lisinopril with hold parameters  We will hold carvedilol for the stress test  I will start as needed hydralazine for extreme hypertension

## 2023-07-26 ENCOUNTER — TELEPHONE (OUTPATIENT)
Dept: HEALTH INFORMATION MANAGEMENT | Facility: OTHER | Age: 58
End: 2023-07-26

## 2023-07-31 ENCOUNTER — TELEPHONE (OUTPATIENT)
Dept: HEALTH INFORMATION MANAGEMENT | Facility: OTHER | Age: 58
End: 2023-07-31